# Patient Record
Sex: FEMALE | Race: WHITE | NOT HISPANIC OR LATINO | Employment: FULL TIME | URBAN - METROPOLITAN AREA
[De-identification: names, ages, dates, MRNs, and addresses within clinical notes are randomized per-mention and may not be internally consistent; named-entity substitution may affect disease eponyms.]

---

## 2017-01-14 ENCOUNTER — DOCUMENTATION ONLY (OUTPATIENT)
Dept: CARDIOLOGY | Facility: CLINIC | Age: 52
End: 2017-01-14

## 2017-01-14 NOTE — Clinical Note
January 14, 2017       TO: Krupa Mack  [address] [place] [facility] [address]       Dear Krupa Mack,    We are reviewing our outstanding orders.    Dr. Lee has ordered an office visit and lab work with her assistant for follow up.     Please contact the scheduling desk at [phone] to arranged for an appointment.     If you have any questions, please call the nurse phone @ [phone]. If you had your lab work done at another facility, please give us a call so we can locate the results.     Thank you

## 2017-01-26 DIAGNOSIS — I25.10 CAD (CORONARY ARTERY DISEASE): Primary | ICD-10-CM

## 2017-07-15 ENCOUNTER — HEALTH MAINTENANCE LETTER (OUTPATIENT)
Age: 52
End: 2017-07-15

## 2017-11-02 DIAGNOSIS — E78.2 MIXED HYPERLIPIDEMIA: ICD-10-CM

## 2017-11-02 DIAGNOSIS — I25.10 CAD (CORONARY ARTERY DISEASE): ICD-10-CM

## 2017-11-02 DIAGNOSIS — I25.10 CORONARY ARTERY DISEASE INVOLVING NATIVE HEART WITHOUT ANGINA PECTORIS, UNSPECIFIED VESSEL OR LESION TYPE: ICD-10-CM

## 2017-11-02 DIAGNOSIS — E78.5 HYPERLIPIDEMIA LDL GOAL <70: ICD-10-CM

## 2017-11-02 RX ORDER — ROSUVASTATIN CALCIUM 40 MG/1
40 TABLET, COATED ORAL DAILY
Qty: 90 TABLET | Refills: 0 | Status: SHIPPED | OUTPATIENT
Start: 2017-11-02 | End: 2018-02-12

## 2017-12-20 DIAGNOSIS — I25.10 CORONARY ARTERY DISEASE INVOLVING NATIVE HEART WITHOUT ANGINA PECTORIS, UNSPECIFIED VESSEL OR LESION TYPE: ICD-10-CM

## 2017-12-20 RX ORDER — METOPROLOL SUCCINATE 25 MG/1
25 TABLET, EXTENDED RELEASE ORAL DAILY
Qty: 90 TABLET | Refills: 0 | Status: SHIPPED | OUTPATIENT
Start: 2017-12-20 | End: 2018-02-14

## 2018-02-06 ENCOUNTER — TELEPHONE (OUTPATIENT)
Dept: CARDIOLOGY | Facility: CLINIC | Age: 53
End: 2018-02-06

## 2018-02-06 DIAGNOSIS — E78.2 MIXED HYPERLIPIDEMIA: ICD-10-CM

## 2018-02-06 DIAGNOSIS — E78.5 HYPERLIPIDEMIA LDL GOAL <70: ICD-10-CM

## 2018-02-06 DIAGNOSIS — I25.10 CORONARY ARTERY DISEASE INVOLVING NATIVE HEART WITHOUT ANGINA PECTORIS, UNSPECIFIED VESSEL OR LESION TYPE: ICD-10-CM

## 2018-02-06 DIAGNOSIS — I25.10 CAD (CORONARY ARTERY DISEASE): ICD-10-CM

## 2018-02-06 NOTE — TELEPHONE ENCOUNTER
Refill request sent from [facility] ([phone]) for Rosuvastatin 40 mg qd. LOV 9/2016. Letter sent 12/2017. No appt scheduled. Left vm for patient to call back to schedule appointment for additional refills. Refill not sent to pharmacy.

## 2018-02-12 RX ORDER — ROSUVASTATIN CALCIUM 40 MG/1
40 TABLET, COATED ORAL DAILY
Qty: 10 TABLET | Refills: 0 | Status: SHIPPED | OUTPATIENT
Start: 2018-02-12 | End: 2018-02-21

## 2018-02-14 RX ORDER — METOPROLOL SUCCINATE 25 MG/1
25 TABLET, EXTENDED RELEASE ORAL DAILY
Qty: 30 TABLET | Refills: 0 | Status: SHIPPED | OUTPATIENT
Start: 2018-02-14 | End: 2018-02-21

## 2018-02-21 ENCOUNTER — OFFICE VISIT (OUTPATIENT)
Dept: CARDIOLOGY | Facility: CLINIC | Age: 53
End: 2018-02-21
Payer: COMMERCIAL

## 2018-02-21 VITALS
DIASTOLIC BLOOD PRESSURE: 80 MMHG | HEIGHT: 65 IN | HEART RATE: 66 BPM | SYSTOLIC BLOOD PRESSURE: 130 MMHG | BODY MASS INDEX: 30.74 KG/M2 | WEIGHT: 184.5 LBS

## 2018-02-21 DIAGNOSIS — E78.5 HYPERLIPIDEMIA LDL GOAL <70: ICD-10-CM

## 2018-02-21 DIAGNOSIS — E78.2 MIXED HYPERLIPIDEMIA: ICD-10-CM

## 2018-02-21 DIAGNOSIS — I25.10 CORONARY ARTERY DISEASE INVOLVING NATIVE HEART WITHOUT ANGINA PECTORIS, UNSPECIFIED VESSEL OR LESION TYPE: ICD-10-CM

## 2018-02-21 DIAGNOSIS — E78.2 MIXED HYPERLIPIDEMIA: Primary | ICD-10-CM

## 2018-02-21 DIAGNOSIS — I25.10 CORONARY ARTERY DISEASE INVOLVING NATIVE CORONARY ARTERY OF NATIVE HEART WITHOUT ANGINA PECTORIS: ICD-10-CM

## 2018-02-21 LAB
CHOLEST SERPL-MCNC: 157 MG/DL
HDLC SERPL-MCNC: 44 MG/DL
LDLC SERPL CALC-MCNC: 100 MG/DL
NONHDLC SERPL-MCNC: 113 MG/DL
TRIGL SERPL-MCNC: 64 MG/DL

## 2018-02-21 PROCEDURE — 99214 OFFICE O/P EST MOD 30 MIN: CPT | Performed by: PHYSICIAN ASSISTANT

## 2018-02-21 PROCEDURE — 36415 COLL VENOUS BLD VENIPUNCTURE: CPT | Performed by: INTERNAL MEDICINE

## 2018-02-21 PROCEDURE — 80061 LIPID PANEL: CPT | Performed by: INTERNAL MEDICINE

## 2018-02-21 RX ORDER — NITROGLYCERIN 0.4 MG/1
0.4 TABLET SUBLINGUAL EVERY 5 MIN PRN
Qty: 25 TABLET | Refills: 1 | Status: SHIPPED | OUTPATIENT
Start: 2018-02-21 | End: 2022-11-17

## 2018-02-21 RX ORDER — METOPROLOL SUCCINATE 25 MG/1
25 TABLET, EXTENDED RELEASE ORAL DAILY
Qty: 30 TABLET | Refills: 11 | Status: SHIPPED | OUTPATIENT
Start: 2018-02-21 | End: 2019-03-25

## 2018-02-21 RX ORDER — ROSUVASTATIN CALCIUM 40 MG/1
40 TABLET, COATED ORAL DAILY
Qty: 30 TABLET | Refills: 11 | Status: SHIPPED | OUTPATIENT
Start: 2018-02-21 | End: 2018-04-11

## 2018-02-21 NOTE — LETTER
"2/21/2018    Karuna Bell MD, MD  [address]  [place] [address]    RE: Krupa Kinneyart       Dear Colleague,    I had the pleasure of seeing Krupa Mack in the [facility].      Cardiology Progress Note    Date of Service: 02/21/2018      Reason for visit: follow up hyperlipidemia, coronary disease.    Primary cardiologist: Dr. Pat Lee      HPI:  Ms. Mack is a very pleasant 52 year old female with a PMHx pertinent for known CAD, with prior NSTEMI in 9/2012 with chest pain and found to have a subtotally occluded large diagonal branch. She underwent a MECCA to the diagonal and has done well since that time. Her last echo, which was performed in 2012 showed EF 50-55% with a mild to moderate distal lateral/anterolaterl hypokinesis and probable apical hypokinesis. There was borderline LVH at that time as well. She was Dr. Lee last in 2016 and had continued to do well without any concerning cardiac symptoms. However, her routine lipids showed an LDL of 156 with an HDL of 44 despite 80mg of atorvastatin. She had admitted to paying less attention to her weight and eating habits. She was changed to rosuvastatin 40mg daily, and plans were her to follow up in 8-10 weeks. Unfortunately, she did not do so. She states that she lost her mother last year and \"I just was distracted and busy.\"    She is here again today, as she had no further medication refills. She tells me that she is feeling \"great\" and has no concerns. She denies chest pain, exertional dyspnea, palpitations, syncope/presyncope, PND, orthopnea, or leg edema. She has been working hard on trying to improve her health once again. Her dietary habits have improved over the last few months and she is slowly losing weight. She is down 4 lbs from her last visit here, and down 20 lbs since 2015. Her BP was elevated on arrival today, at 148 systolic; however she states she was rushing around to get " here on time. I took it again at the end of her visit, and it was 130/80. She routinely runs much lower than this, upon review of previous vitals. She does not ever check her BP outside of doctor visits as it's never been a problem in the past.     Her lipid pane today is improved; her LDL is down to 100 (from 156), and her HDL remains low at 44. TG are in the normal range at 64, which is also an improvement from 2016.       ASSESSMENT/PLAN:    1. Mixed hyperlipidemia.   --Improved lipid panel on rosuvastatin 40mg daily. Given her known coronary disease, would like to see her LDL slightly lower, but she plans to continue to make some dietary changes and is working on losing more weight.  She also relays a family history of dyslipidemia.     2. History of CAD.   --NSTEMI in 9/2012 with associated chest pain and found to have a subtotally occluded large diagonal branch. She underwent a MECCA to the diagonal and has done well since that time.    --Last echo 9/2012 showed EF 50-55% with a mild to moderate distal lateral/anterolaterl hypokinesis and probable apical hypokinesis. There was borderline LVH at that time as well.  As she remains asymptomatic will defer decision on repeat imaging/stress testing to Dr. Lee at follow up visit.   --Continue ASA 81mg daily.   --Continue metoprolol 25mg daily. BP up slightly today, with a resting HR in the low 60s. Asked her to try to check this routinely in other places if possible and call if it is persistently >140/90. This will also hopefully come back down with ongoing lifestyle changes. Historically she has not required additional antihypertensives.       Follow up plan: In 6 months with Dr. Pat Lee, as she has not seen her since 2016.       Orders this Visit:  Orders Placed This Encounter   Procedures     Lipid Profile     ALT     Follow-Up with Cardiologist     Orders Placed This Encounter   Medications     rosuvastatin (CRESTOR) 40 MG tablet     Sig:  Take 1 tablet (40 mg) by mouth daily     Dispense:  30 tablet     Refill:  11     nitroGLYcerin (NITROSTAT) 0.4 MG sublingual tablet     Sig: Place 1 tablet (0.4 mg) under the tongue every 5 minutes as needed for chest pain     Dispense:  25 tablet     Refill:  1     metoprolol succinate (TOPROL-XL) 25 MG 24 hr tablet     Sig: Take 1 tablet (25 mg) by mouth daily     Dispense:  30 tablet     Refill:  11     aspirin 81 MG EC tablet     Sig: Take 1 tablet (81 mg) by mouth daily     Dispense:  10 tablet     Refill:  0     Medications Discontinued During This Encounter   Medication Reason     rosuvastatin (CRESTOR) 40 MG tablet Reorder     nitroglycerin (NITROSTAT) 0.4 MG SL tablet Reorder     metoprolol succinate (TOPROL-XL) 25 MG 24 hr tablet Reorder     aspirin 81 MG EC tablet Reorder           CURRENT MEDICATIONS:  Current Outpatient Prescriptions   Medication Sig Dispense Refill     rosuvastatin (CRESTOR) 40 MG tablet Take 1 tablet (40 mg) by mouth daily 30 tablet 11     nitroGLYcerin (NITROSTAT) 0.4 MG sublingual tablet Place 1 tablet (0.4 mg) under the tongue every 5 minutes as needed for chest pain 25 tablet 1     metoprolol succinate (TOPROL-XL) 25 MG 24 hr tablet Take 1 tablet (25 mg) by mouth daily 30 tablet 11     aspirin 81 MG EC tablet Take 1 tablet (81 mg) by mouth daily 10 tablet 0     [DISCONTINUED] metoprolol succinate (TOPROL-XL) 25 MG 24 hr tablet Take 1 tablet (25 mg) by mouth daily 30 tablet 0     [DISCONTINUED] rosuvastatin (CRESTOR) 40 MG tablet Take 1 tablet (40 mg) by mouth daily 10 tablet 0     [DISCONTINUED] nitroglycerin (NITROSTAT) 0.4 MG SL tablet Place 1 tablet (0.4 mg) under the tongue every 5 minutes as needed for chest pain 90 tablet 1       ALLERGIES   No Known Allergies    PAST MEDICAL HISTORY:  Past Medical History:   Diagnosis Date     CAD (coronary artery disease) 9/2012    Non-ST elevation myocardial infarction.     Dyslipidemia      HTN (hypertension)      Hyperlipidaemia       "Obesity      Tobacco abuse     quit 9/25/2012       PAST SURGICAL HISTORY:  Past Surgical History:   Procedure Laterality Date     HEART CATH, ANGIOPLASTY  9/26/12    2.5 x16 mm Promus Element MECCA & 2.25 X 12 mm Promus Eement MECCA-lg diag branch     NO HISTORY OF SURGERY         FAMILY HISTORY:  Family History   Problem Relation Age of Onset     C.A.D. Father 75     DIABETES Father      teenager     DIABETES Mother 80     DIABETES Brother        SOCIAL HISTORY:  Social History     Social History     Marital status:      Spouse name: N/A     Number of children: N/A     Years of education: N/A     Social History Main Topics     Smoking status: Former Smoker     Smokeless tobacco: Never Used     Alcohol use Yes      Comment: rare     Drug use: No     Sexual activity: Not Asked     Other Topics Concern     Caffeine Concern Not Asked     0-1 coffee or soda     Sleep Concern No     Stress Concern No     Weight Concern No     Special Diet Yes     Heart healthy      Exercise Yes     walking     Seat Belt Yes     Social History Narrative    10/2012         Children-3     Work- [employer]     Tobacco- quit 9/2012- smoked 1/2ppdx 28yrs    ETOH- 1/ month    Exercise- now- walking               Review of Systems:  Cardiovascular: negative for chest pain, palpitations, orthopnea, PND, LE edema  Constitutional: negative for chills, sweats, fevers   Resp: Negative for dyspnea at rest, dyspnea on exertion, cough, known chronic lung disease  HEENT: Negative for new visual changes, frequent headaches  Gastrointestinal: negative for abdominal pain, diarrhea, nausea, vomiting  Hematologic/lymphatic: negative for current systemic anticoagulation, hx of blood clots  Musculoskeletal: negative for new back pain, joint pain  Neurological: negative for focal weakness, LOC, seizures, syncope      Physical Exam:  Vitals: /80  Pulse 66  Ht 1.651 m (5' 5\")  Wt 83.7 kg (184 lb 8 oz)  BMI 30.7 kg/m2   Wt " Readings from Last 4 Encounters:   02/21/18 83.7 kg (184 lb 8 oz)   09/22/16 85.3 kg (188 lb 1.6 oz)   06/16/15 95.3 kg (210 lb)   06/24/14 88.5 kg (195 lb)       GEN:  In general, this is a well nourished  female in no acute distress on room air.  Patient ambulatory.  HEENT:  Pupils equal, round. Sclerae nonicteric. Clear oropharynx.   NECK: Supple, no masses appreciated. Trachea midline. No JVD.   C/V:  Regular rate and rhythm, no murmur, rub or gallop.   RESP: Respirations are unlabored. No use of accessory muscles. Clear to auscultation bilaterally without wheezing, rales, or rhonchi.  GI: Abdomen soft, nontender, nondistended. No HSM appreciated.   EXTREM: No LE edema. No cyanosis or clubbing.  NEURO: Alert and oriented, cooperative. Gait normal. No obvious focal deficits.   PSYCH: Normal affect.  SKIN: Warm and dry. No rashes or petechiae appreciated.       Recent Lab Results:  LIPID RESULTS:  Lab Results   Component Value Date    CHOL 157 02/21/2018    HDL 44 (L) 02/21/2018     (H) 02/21/2018    TRIG 64 02/21/2018     Naya Rojo PA-C  [facility]  Pager [phone]    Thank you for allowing me to participate in the care of your patient.    Sincerely,     DERRICK Ramos     [facility]

## 2018-02-21 NOTE — MR AVS SNAPSHOT
After Visit Summary   2/21/2018    Krupa Tomas    MRN: [id number]           Patient Information     Date Of Birth          1965        Visit Information        Provider Department      2/21/2018 8:40 AM Naya Rojo PA [facility]        Today's Diagnoses     Mixed hyperlipidemia    -  1    Coronary artery disease involving native coronary artery of native heart without angina pectoris        Hyperlipidemia LDL goal <70          Care Instructions    Visit Summary:    Today we discussed:   Your cholesterol panel is improved, continue your healthy lifestyle changes, the LDL will hopefully continue to improve with weight loss.    If you get the aspirin over the counter, use 81mg (baby ASA), enteric coated which will be easier on your stomach.    Please check your BP when you can, if routinely >140/90s, please call the office.     Medication changes:    NONE  Sent refills to your pharmacy for 1 year    Test results:   Results for KRUPA TOMAS (MRN [id number]) as of 2/21/2018 08:57   Ref. Range 9/22/2016 07:49 2/21/2018 07:36   Cholesterol Latest Ref Range: <200 mg/dL 216 (H) 157   HDL Cholesterol Latest Ref Range: >49 mg/dL 44 (L) 44 (L)   LDL Cholesterol Calculated Latest Ref Range: <100 mg/dL 156 (H) 100 (H)   Non HDL Cholesterol Latest Ref Range: <130 mg/dL 172 (H) 113   Triglycerides Latest Ref Range: <150 mg/dL 82 64       Follow up:   6 months with Dr. BABIN    Please call my nurse Kiera at [phone] with any questions or concerns. Scheduling phone number: [phone] if needed.               Follow-ups after your visit        Additional Services     Follow-Up with Cardiologist                 Future tests that were ordered for you today     Open Future Orders        Priority Expected Expires Ordered    Follow-Up with Cardiologist Routine 8/21/2018 2/21/2019 2/21/2018            Who to contact     If you have questions or need follow up information  "about today's clinic visit or your schedule please contact [facility] directly at [phone].  Normal or non-critical lab and imaging results will be communicated to you by [employer], letter or phone within 4 business days after the clinic has received the results. If you do not hear from us within 7 days, please contact the clinic through [employer] or phone. If you have a critical or abnormal lab result, we will notify you by phone as soon as possible.  Submit refill requests through [employer] or call your pharmacy and they will forward the refill request to us. Please allow 3 business days for your refill to be completed.          Additional Information About Your Visit        [employer] Information     [employer] lets you send messages to your doctor, view your test results, renew your prescriptions, schedule appointments and more. To sign up, go to [email or web address] . Click on \"Log in\" on the left side of the screen, which will take you to the Welcome page. Then click on \"Sign up Now\" on the right side of the page.     You will be asked to enter the access code listed below, as well as some personal information. Please follow the directions to create your username and password.     Your access code is: [email or web address]  Expires: 2018  8:58 AM     Your access code will  in 90 days. If you need help or a new code, please call your [place] clinic or [phone].        Care EveryWhere ID     This is your Care EveryWhere ID. This could be used by other organizations to access your [place] medical records  [id number]        Your Vitals Were     Pulse Height BMI (Body Mass Index)             66 1.651 m (5' 5\") 30.7 kg/m2          Blood Pressure from Last 3 Encounters:   18 130/80   16 116/72   06/16/15 106/78    Weight from Last 3 Encounters:   18 83.7 kg (184 lb 8 oz)   16 85.3 kg (188 lb 1.6 oz)   06/16/15 95.3 kg (210 lb)              We " Performed the Following     Follow-Up with Cardiac Advanced Practice Provider          Where to get your medicines      These medications were sent to [facility] [id number] [facility] [place] - [address]  [address] [place] [address]     Phone:  [phone]     aspirin 81 MG EC tablet    metoprolol succinate 25 MG 24 hr tablet    nitroGLYcerin 0.4 MG sublingual tablet    rosuvastatin 40 MG tablet          Primary Care Provider Office Phone # Fax #    Karuna Bell [phone] [address] [place] [address]        Equal Access to Services     [facility]: Hadii aad ku hadasho Soomaali, waaxda luqadaha, qaybta kaalmada adeegyada, waxay idiin hayaan adeeg gordo mcintyre . So [facility] [phone].    ATENCIÓN: Si habla español, tiene a sierra disposición servicios gratuitos de asistencia lingüística. [facility] [phone].    We comply with applicable federal civil rights laws and [place] laws. We do not discriminate on the basis of race, color, national origin, age, disability, sex, sexual orientation, or gender identity.            Thank you!     Thank you for choosing [facility]  for your care. Our goal is always to provide you with excellent care. Hearing back from our patients is one way we can continue to improve our services. Please take a few minutes to complete the written survey that you may receive in the mail after your visit with us. Thank you!             Your Updated Medication List - Protect others around you: Learn how to safely use, store and throw away your medicines at www.disposemymeds.org.          This list is accurate as of 2/21/18  9:00 AM.  Always use your most recent med list.                   Brand Name Dispense Instructions for use Diagnosis    aspirin 81 MG EC tablet     10 tablet    Take 1 tablet (81 mg) by mouth daily    Coronary artery disease involving native coronary artery of native heart without angina  pectoris       metoprolol succinate 25 MG 24 hr tablet    TOPROL-XL    30 tablet    Take 1 tablet (25 mg) by mouth daily        nitroGLYcerin 0.4 MG sublingual tablet    NITROSTAT    25 tablet    Place 1 tablet (0.4 mg) under the tongue every 5 minutes as needed for chest pain    Hyperlipidemia LDL goal <70       rosuvastatin 40 MG tablet    CRESTOR    30 tablet    Take 1 tablet (40 mg) by mouth daily    Mixed hyperlipidemia, Hyperlipidemia LDL goal <70

## 2018-02-21 NOTE — PATIENT INSTRUCTIONS
Visit Summary:    Today we discussed:   Your cholesterol panel is improved, continue your healthy lifestyle changes, the LDL will hopefully continue to improve with weight loss.    If you get the aspirin over the counter, use 81mg (baby ASA), enteric coated which will be easier on your stomach.    Please check your BP when you can, if routinely >140/90s, please call the office.     Medication changes:    NONE  Sent refills to your pharmacy for 1 year    Test results:   Results for LIEN TOMAS (MRN [id number]) as of 2/21/2018 08:57   Ref. Range 9/22/2016 07:49 2/21/2018 07:36   Cholesterol Latest Ref Range: <200 mg/dL 216 (H) 157   HDL Cholesterol Latest Ref Range: >49 mg/dL 44 (L) 44 (L)   LDL Cholesterol Calculated Latest Ref Range: <100 mg/dL 156 (H) 100 (H)   Non HDL Cholesterol Latest Ref Range: <130 mg/dL 172 (H) 113   Triglycerides Latest Ref Range: <150 mg/dL 82 64       Follow up:   6 months with Dr. BABIN    Please call my nurse Kiera at [phone] with any questions or concerns. Scheduling phone number: [phone] if needed.

## 2018-02-21 NOTE — PROGRESS NOTES
"  Cardiology Progress Note    Date of Service: 02/21/2018      Reason for visit: follow up hyperlipidemia, coronary disease.    Primary cardiologist: Dr. Pat Lee      HPI:  Ms. Mack is a very pleasant 52 year old female with a PMHx pertinent for known CAD, with prior NSTEMI in 9/2012 with chest pain and found to have a subtotally occluded large diagonal branch. She underwent a MECCA to the diagonal and has done well since that time. Her last echo, which was performed in 2012 showed EF 50-55% with a mild to moderate distal lateral/anterolaterl hypokinesis and probable apical hypokinesis. There was borderline LVH at that time as well. She was Dr. Lee last in 2016 and had continued to do well without any concerning cardiac symptoms. However, her routine lipids showed an LDL of 156 with an HDL of 44 despite 80mg of atorvastatin. She had admitted to paying less attention to her weight and eating habits. She was changed to rosuvastatin 40mg daily, and plans were her to follow up in 8-10 weeks. Unfortunately, she did not do so. She states that she lost her mother last year and \"I just was distracted and busy.\"    She is here again today, as she had no further medication refills. She tells me that she is feeling \"great\" and has no concerns. She denies chest pain, exertional dyspnea, palpitations, syncope/presyncope, PND, orthopnea, or leg edema. She has been working hard on trying to improve her health once again. Her dietary habits have improved over the last few months and she is slowly losing weight. She is down 4 lbs from her last visit here, and down 20 lbs since 2015. Her BP was elevated on arrival today, at 148 systolic; however she states she was rushing around to get here on time. I took it again at the end of her visit, and it was 130/80. She routinely runs much lower than this, upon review of previous vitals. She does not ever check her BP outside of doctor visits as it's never been a " problem in the past.     Her lipid pane today is improved; her LDL is down to 100 (from 156), and her HDL remains low at 44. TG are in the normal range at 64, which is also an improvement from 2016.       ASSESSMENT/PLAN:    1. Mixed hyperlipidemia.   --Improved lipid panel on rosuvastatin 40mg daily. Given her known coronary disease, would like to see her LDL slightly lower, but she plans to continue to make some dietary changes and is working on losing more weight.  She also relays a family history of dyslipidemia.     2. History of CAD.   --NSTEMI in 9/2012 with associated chest pain and found to have a subtotally occluded large diagonal branch. She underwent a MECCA to the diagonal and has done well since that time.    --Last echo 9/2012 showed EF 50-55% with a mild to moderate distal lateral/anterolaterl hypokinesis and probable apical hypokinesis. There was borderline LVH at that time as well.  As she remains asymptomatic will defer decision on repeat imaging/stress testing to Dr. Lee at follow up visit.   --Continue ASA 81mg daily.   --Continue metoprolol 25mg daily. BP up slightly today, with a resting HR in the low 60s. Asked her to try to check this routinely in other places if possible and call if it is persistently >140/90. This will also hopefully come back down with ongoing lifestyle changes. Historically she has not required additional antihypertensives.       Follow up plan: In 6 months with Dr. Pat Lee, as she has not seen her since 2016.       Orders this Visit:  Orders Placed This Encounter   Procedures     Lipid Profile     ALT     Follow-Up with Cardiologist     Orders Placed This Encounter   Medications     rosuvastatin (CRESTOR) 40 MG tablet     Sig: Take 1 tablet (40 mg) by mouth daily     Dispense:  30 tablet     Refill:  11     nitroGLYcerin (NITROSTAT) 0.4 MG sublingual tablet     Sig: Place 1 tablet (0.4 mg) under the tongue every 5 minutes as needed for chest pain      Dispense:  25 tablet     Refill:  1     metoprolol succinate (TOPROL-XL) 25 MG 24 hr tablet     Sig: Take 1 tablet (25 mg) by mouth daily     Dispense:  30 tablet     Refill:  11     aspirin 81 MG EC tablet     Sig: Take 1 tablet (81 mg) by mouth daily     Dispense:  10 tablet     Refill:  0     Medications Discontinued During This Encounter   Medication Reason     rosuvastatin (CRESTOR) 40 MG tablet Reorder     nitroglycerin (NITROSTAT) 0.4 MG SL tablet Reorder     metoprolol succinate (TOPROL-XL) 25 MG 24 hr tablet Reorder     aspirin 81 MG EC tablet Reorder           CURRENT MEDICATIONS:  Current Outpatient Prescriptions   Medication Sig Dispense Refill     rosuvastatin (CRESTOR) 40 MG tablet Take 1 tablet (40 mg) by mouth daily 30 tablet 11     nitroGLYcerin (NITROSTAT) 0.4 MG sublingual tablet Place 1 tablet (0.4 mg) under the tongue every 5 minutes as needed for chest pain 25 tablet 1     metoprolol succinate (TOPROL-XL) 25 MG 24 hr tablet Take 1 tablet (25 mg) by mouth daily 30 tablet 11     aspirin 81 MG EC tablet Take 1 tablet (81 mg) by mouth daily 10 tablet 0     [DISCONTINUED] metoprolol succinate (TOPROL-XL) 25 MG 24 hr tablet Take 1 tablet (25 mg) by mouth daily 30 tablet 0     [DISCONTINUED] rosuvastatin (CRESTOR) 40 MG tablet Take 1 tablet (40 mg) by mouth daily 10 tablet 0     [DISCONTINUED] nitroglycerin (NITROSTAT) 0.4 MG SL tablet Place 1 tablet (0.4 mg) under the tongue every 5 minutes as needed for chest pain 90 tablet 1       ALLERGIES   No Known Allergies    PAST MEDICAL HISTORY:  Past Medical History:   Diagnosis Date     CAD (coronary artery disease) 9/2012    Non-ST elevation myocardial infarction.     Dyslipidemia      HTN (hypertension)      Hyperlipidaemia      Obesity      Tobacco abuse     quit 9/25/2012       PAST SURGICAL HISTORY:  Past Surgical History:   Procedure Laterality Date     HEART CATH, ANGIOPLASTY  9/26/12    2.5 x16 mm Promus Element MECCA & 2.25 X 12 mm Promus Eement  "MECCA- dia branch     NO HISTORY OF SURGERY         FAMILY HISTORY:  Family History   Problem Relation Age of Onset     C.A.D. Father 75     DIABETES Father      teenager     DIABETES Mother 80     DIABETES Brother        SOCIAL HISTORY:  Social History     Social History     Marital status:      Spouse name: N/A     Number of children: N/A     Years of education: N/A     Social History Main Topics     Smoking status: Former Smoker     Smokeless tobacco: Never Used     Alcohol use Yes      Comment: rare     Drug use: No     Sexual activity: Not Asked     Other Topics Concern     Caffeine Concern Not Asked     0-1 coffee or soda     Sleep Concern No     Stress Concern No     Weight Concern No     Special Diet Yes     Heart healthy      Exercise Yes     walking     Seat Belt Yes     Social History Narrative    10/2012         Children-3     Work- [employer]-     Tobacco- quit 9/2012- smoked 1/2ppdx 28yrs    ETOH- 1/ month    Exercise- now- walking               Review of Systems:  Cardiovascular: negative for chest pain, palpitations, orthopnea, PND, LE edema  Constitutional: negative for chills, sweats, fevers   Resp: Negative for dyspnea at rest, dyspnea on exertion, cough, known chronic lung disease  HEENT: Negative for new visual changes, frequent headaches  Gastrointestinal: negative for abdominal pain, diarrhea, nausea, vomiting  Hematologic/lymphatic: negative for current systemic anticoagulation, hx of blood clots  Musculoskeletal: negative for new back pain, joint pain  Neurological: negative for focal weakness, LOC, seizures, syncope      Physical Exam:  Vitals: /80  Pulse 66  Ht 1.651 m (5' 5\")  Wt 83.7 kg (184 lb 8 oz)  BMI 30.7 kg/m2   Wt Readings from Last 4 Encounters:   02/21/18 83.7 kg (184 lb 8 oz)   09/22/16 85.3 kg (188 lb 1.6 oz)   06/16/15 95.3 kg (210 lb)   06/24/14 88.5 kg (195 lb)       GEN:  In general, this is a well nourished  female in no " acute distress on room air.  Patient ambulatory.  HEENT:  Pupils equal, round. Sclerae nonicteric. Clear oropharynx.   NECK: Supple, no masses appreciated. Trachea midline. No JVD.   C/V:  Regular rate and rhythm, no murmur, rub or gallop.   RESP: Respirations are unlabored. No use of accessory muscles. Clear to auscultation bilaterally without wheezing, rales, or rhonchi.  GI: Abdomen soft, nontender, nondistended. No HSM appreciated.   EXTREM: No LE edema. No cyanosis or clubbing.  NEURO: Alert and oriented, cooperative. Gait normal. No obvious focal deficits.   PSYCH: Normal affect.  SKIN: Warm and dry. No rashes or petechiae appreciated.       Recent Lab Results:  LIPID RESULTS:  Lab Results   Component Value Date    CHOL 157 02/21/2018    HDL 44 (L) 02/21/2018     (H) 02/21/2018    TRIG 64 02/21/2018         Naya Rojo PA-C  [facility]  Pager [phone]

## 2018-04-11 DIAGNOSIS — E78.5 HYPERLIPIDEMIA LDL GOAL <70: ICD-10-CM

## 2018-04-11 DIAGNOSIS — E78.2 MIXED HYPERLIPIDEMIA: ICD-10-CM

## 2018-04-11 RX ORDER — ROSUVASTATIN CALCIUM 40 MG/1
40 TABLET, COATED ORAL DAILY
Qty: 90 TABLET | Refills: 3 | Status: SHIPPED | OUTPATIENT
Start: 2018-04-11 | End: 2018-04-18

## 2018-04-18 DIAGNOSIS — E78.5 HYPERLIPIDEMIA LDL GOAL <70: ICD-10-CM

## 2018-04-18 DIAGNOSIS — E78.2 MIXED HYPERLIPIDEMIA: ICD-10-CM

## 2018-04-18 RX ORDER — ROSUVASTATIN CALCIUM 40 MG/1
40 TABLET, COATED ORAL DAILY
Qty: 90 TABLET | Refills: 1 | Status: SHIPPED | OUTPATIENT
Start: 2018-04-18 | End: 2019-04-22

## 2019-03-25 DIAGNOSIS — I25.10 CAD (CORONARY ARTERY DISEASE): Primary | ICD-10-CM

## 2019-03-25 RX ORDER — METOPROLOL SUCCINATE 25 MG/1
25 TABLET, EXTENDED RELEASE ORAL DAILY
Qty: 90 TABLET | Refills: 0 | Status: SHIPPED | OUTPATIENT
Start: 2019-03-25 | End: 2019-06-19

## 2019-04-22 DIAGNOSIS — E78.2 MIXED HYPERLIPIDEMIA: ICD-10-CM

## 2019-04-22 DIAGNOSIS — E78.5 HYPERLIPIDEMIA LDL GOAL <70: ICD-10-CM

## 2019-04-22 RX ORDER — ROSUVASTATIN CALCIUM 40 MG/1
40 TABLET, COATED ORAL DAILY
Qty: 90 TABLET | Refills: 0 | Status: SHIPPED | OUTPATIENT
Start: 2019-04-22 | End: 2019-07-15

## 2019-04-22 RX ORDER — ROSUVASTATIN CALCIUM 40 MG/1
40 TABLET, COATED ORAL DAILY
Qty: 60 TABLET | Refills: 0 | Status: SHIPPED | OUTPATIENT
Start: 2019-04-22 | End: 2019-04-22

## 2019-04-22 NOTE — TELEPHONE ENCOUNTER
Received refill request for: Rosuvastatin (crestor)  Last OV was: 2/21/2018 with Naya MEZA  Labs/EKG: last lipids 2/2018  F/U scheduled: with Dr Allan on 5/3/2019 w/labs - lipids  New script sent to: [facility]  Kiera Santiago RN 04/22/19 4:24 PM

## 2019-05-03 ENCOUNTER — OFFICE VISIT (OUTPATIENT)
Dept: CARDIOLOGY | Facility: CLINIC | Age: 54
End: 2019-05-03
Payer: COMMERCIAL

## 2019-05-03 VITALS
HEART RATE: 68 BPM | OXYGEN SATURATION: 97 % | WEIGHT: 171 LBS | DIASTOLIC BLOOD PRESSURE: 80 MMHG | HEIGHT: 65 IN | SYSTOLIC BLOOD PRESSURE: 132 MMHG | BODY MASS INDEX: 28.49 KG/M2

## 2019-05-03 DIAGNOSIS — E78.5 HYPERLIPIDEMIA LDL GOAL <70: ICD-10-CM

## 2019-05-03 DIAGNOSIS — I25.10 CORONARY ARTERY DISEASE INVOLVING NATIVE CORONARY ARTERY OF NATIVE HEART WITHOUT ANGINA PECTORIS: Primary | ICD-10-CM

## 2019-05-03 DIAGNOSIS — E78.2 MIXED HYPERLIPIDEMIA: ICD-10-CM

## 2019-05-03 DIAGNOSIS — I25.10 CORONARY ARTERY DISEASE INVOLVING NATIVE CORONARY ARTERY OF NATIVE HEART WITHOUT ANGINA PECTORIS: ICD-10-CM

## 2019-05-03 LAB
ALT SERPL W P-5'-P-CCNC: 9 U/L (ref 5–30)
CHOLEST SERPL-MCNC: 170 MG/DL
HDLC SERPL-MCNC: 50 MG/DL
LDLC SERPL CALC-MCNC: 104 MG/DL
NONHDLC SERPL-MCNC: 120 MG/DL
TRIGL SERPL-MCNC: 80 MG/DL

## 2019-05-03 PROCEDURE — 99214 OFFICE O/P EST MOD 30 MIN: CPT | Performed by: INTERNAL MEDICINE

## 2019-05-03 PROCEDURE — 84460 ALANINE AMINO (ALT) (SGPT): CPT | Performed by: INTERNAL MEDICINE

## 2019-05-03 PROCEDURE — 36415 COLL VENOUS BLD VENIPUNCTURE: CPT | Performed by: INTERNAL MEDICINE

## 2019-05-03 PROCEDURE — 80061 LIPID PANEL: CPT | Performed by: INTERNAL MEDICINE

## 2019-05-03 ASSESSMENT — MIFFLIN-ST. JEOR: SCORE: 1371.53

## 2019-05-03 NOTE — LETTER
5/3/2019    Karuna Bell MD, MD  [address]  [place] [address]    RE: Krupa Mack       Dear Colleague,    I had the pleasure of seeing Krupa FARRAH Mack in the [facility].    HPI and Plan:   See dictation    No orders of the defined types were placed in this encounter.      No orders of the defined types were placed in this encounter.      There are no discontinued medications.      Encounter Diagnoses   Name Primary?     Coronary artery disease involving native coronary artery of native heart without angina pectoris Yes     Hyperlipidemia LDL goal <70        CURRENT MEDICATIONS:  Current Outpatient Medications   Medication Sig Dispense Refill     aspirin 81 MG EC tablet Take 1 tablet (81 mg) by mouth daily 10 tablet 0     metoprolol succinate ER (TOPROL-XL) 25 MG 24 hr tablet Take 1 tablet (25 mg) by mouth daily 90 tablet 0     nitroGLYcerin (NITROSTAT) 0.4 MG sublingual tablet Place 1 tablet (0.4 mg) under the tongue every 5 minutes as needed for chest pain 25 tablet 1     rosuvastatin (CRESTOR) 40 MG tablet Take 1 tablet (40 mg) by mouth daily 90 tablet 0       ALLERGIES   No Known Allergies    PAST MEDICAL HISTORY:  Past Medical History:   Diagnosis Date     CAD (coronary artery disease) 9/2012    Non-ST elevation myocardial infarction.     Dyslipidemia      HTN (hypertension)      Hyperlipidaemia      Obesity      Tobacco abuse     quit 9/25/2012       PAST SURGICAL HISTORY:  Past Surgical History:   Procedure Laterality Date     HEART CATH, ANGIOPLASTY  9/26/12    2.5 x16 mm Promus Element MECCA & 2.25 X 12 mm Promus Eement MECCA-lg diag branch     NO HISTORY OF SURGERY         FAMILY HISTORY:  Family History   Problem Relation Age of Onset     C.A.D. Father 75     Diabetes Father         teenager     Diabetes Mother 80     Diabetes Brother        SOCIAL HISTORY:  Social History     Socioeconomic History     Marital status:      Spouse name: None     Number  of children: None     Years of education: None     Highest education level: None   Occupational History     None   Social Needs     Financial resource strain: None     Food insecurity:     Worry: None     Inability: None     Transportation needs:     Medical: None     Non-medical: None   Tobacco Use     Smoking status: Former Smoker     Smokeless tobacco: Never Used   Substance and Sexual Activity     Alcohol use: Yes     Comment: 2 glasses of wine per week     Drug use: No     Sexual activity: None   Lifestyle     Physical activity:     Days per week: None     Minutes per session: None     Stress: None   Relationships     Social connections:     Talks on phone: None     Gets together: None     Attends Zoroastrianism service: None     Active member of club or organization: None     Attends meetings of clubs or organizations: None     Relationship status: None     Intimate partner violence:     Fear of current or ex partner: None     Emotionally abused: None     Physically abused: None     Forced sexual activity: None   Other Topics Concern     Parent/sibling w/ CABG, MI or angioplasty before 65F 55M? Not Asked      Service Not Asked     Blood Transfusions Not Asked     Caffeine Concern Not Asked     Comment: 0-1 coffee or soda     Occupational Exposure Not Asked     Hobby Hazards Not Asked     Sleep Concern No     Stress Concern No     Weight Concern No     Special Diet Yes     Comment: Heart healthy      Back Care Not Asked     Exercise Yes     Comment: walking     Bike Helmet Not Asked     Seat Belt Yes     Self-Exams Not Asked   Social History Narrative    10/2012         Children-3     Work- [employer]-     Tobacco- quit 9/2012- smoked 1/2ppdx 28yrs    ETOH- 1/ month    Exercise- now- walking               Review of Systems:  Skin:  Negative       Eyes:  Negative      ENT:  Negative      Respiratory:  Negative       Cardiovascular:  Negative;palpitations;chest pain;syncope or  "near-syncope;cyanosis;lightheadedness;dizziness;exercise intolerance;fatigue;edema      Gastroenterology: Negative      Genitourinary:  Negative      Musculoskeletal:  Negative      Neurologic:  Negative      Psychiatric:  Negative (stress)    Heme/Lymph/Imm:  Negative      Endocrine:  Negative        Physical Exam:  Vitals: /80   Pulse 68   Ht 1.651 m (5' 5\")   Wt 77.6 kg (171 lb)   SpO2 97%   BMI 28.46 kg/m       Constitutional:  cooperative;in no acute distress        Skin:  warm and dry to the touch          Head:  normocephalic        Eyes:  conjunctivae and lids unremarkable        Lymph:No Cervical lymphadenopathy present     ENT:  no pallor or cyanosis        Neck:  carotid pulses are full and equal bilaterally;JVP normal        Respiratory:  clear to auscultation         Cardiac: regular rhythm;normal S1 and S2;no murmurs, gallops or rubs detected                pulses full and equal                                        GI:  abdomen soft;non-tender        Extremities and Muscular Skeletal:  no deformities, clubbing, cyanosis, erythema observed              Neurological:  no gross motor deficits        Psych:  Alert and Oriented x 3        CC  Karuna Bell MD  [address] [address]                Thank you for allowing me to participate in the care of your patient.      Sincerely,     Arya Allan MD, MD     [facility]    cc:   Karuna Bell MD  [address] [address]        "

## 2019-05-03 NOTE — LETTER
5/3/2019      Karuna Bell MD, MD  [address]  [facility] [address]      RE: Krupa Mack       Dear Colleague,    I had the [facility] Krupa [facility].    Service Date: 05/03/2019      CONSULTATION NOTE      REASON FOR CONSULTATION:  Coronary artery disease.       HISTORY OF PRESENT ILLNESS:  I had the pleasure of seeing Krupa Mack at the [facility] in [place] this morning.  She is a very pleasant 54-year-old female with known coronary artery disease status post prior lateral *** myocardial infarction in 2002.  Coronary angiogram at that time demonstrated an occluded large diagonal branch.  This was successfully stented with 2 stents.  Echocardiogram post MI demonstrated EF of 55%.      Since her MI and intervention approximately 6-1/2 years ago, she has not had any cardiopulmonary symptoms.  She is active and denies any chest pain or shortness of breath.  No palpitations, presyncope or syncope.  Her risk factors are reasonably well controlled with the exception of hyperlipidemia.      IMPRESSION, REPORT AND PLAN:   1.  Coronary artery disease status post prior MI with stenting of the diagonal.   2.  Hypertension.   3.  Hyperlipidemia.      She is doing quite well from a cardiac point of view and denies any symptoms at this point.  She is active and exercises regularly.  I reviewed her most recent lipid profile from today which showed an LDL of 104, total cholesterol of 170 and HDL of 50.      Although she is on high-dose of rosuvastatin, we have room to improve in her cholesterol.  I recommended that she continue with her exercise program and to lose more weight.  I think with additional 10-15 pound weight loss, we should be able to get her LDL close to 70.      It was a pleasure seeing Ms. Mack in clinic this morning.  I will see her in approximately 1-2 years for followup.  I appreciate the opportunity to  take part in her care.         MARY ALLAN MD             D: 2019   T: 2019   MT: SABRINA      Name:     LIEN TOMAS   MRN:      [id number]        Account:      [id number]   :      1965           Service Date: 2019      Document: [id number]         Outpatient Encounter Medications as of 5/3/2019   Medication Sig Dispense Refill     aspirin 81 MG EC tablet Take 1 tablet (81 mg) by mouth daily 10 tablet 0     metoprolol succinate ER (TOPROL-XL) 25 MG 24 hr tablet Take 1 tablet (25 mg) by mouth daily 90 tablet 0     nitroGLYcerin (NITROSTAT) 0.4 MG sublingual tablet Place 1 tablet (0.4 mg) under the tongue every 5 minutes as needed for chest pain 25 tablet 1     rosuvastatin (CRESTOR) 40 MG tablet Take 1 tablet (40 mg) by mouth daily 90 tablet 0     No facility-administered encounter medications on file as of 5/3/2019.        Again, thank you for allowing me to participate in the care of your patient.      Sincerely,    Mary Allan MD, MD     [facility]

## 2019-05-03 NOTE — PROGRESS NOTES
HPI and Plan:   See dictation    No orders of the defined types were placed in this encounter.      No orders of the defined types were placed in this encounter.      There are no discontinued medications.      Encounter Diagnoses   Name Primary?     Coronary artery disease involving native coronary artery of native heart without angina pectoris Yes     Hyperlipidemia LDL goal <70        CURRENT MEDICATIONS:  Current Outpatient Medications   Medication Sig Dispense Refill     aspirin 81 MG EC tablet Take 1 tablet (81 mg) by mouth daily 10 tablet 0     metoprolol succinate ER (TOPROL-XL) 25 MG 24 hr tablet Take 1 tablet (25 mg) by mouth daily 90 tablet 0     nitroGLYcerin (NITROSTAT) 0.4 MG sublingual tablet Place 1 tablet (0.4 mg) under the tongue every 5 minutes as needed for chest pain 25 tablet 1     rosuvastatin (CRESTOR) 40 MG tablet Take 1 tablet (40 mg) by mouth daily 90 tablet 0       ALLERGIES   No Known Allergies    PAST MEDICAL HISTORY:  Past Medical History:   Diagnosis Date     CAD (coronary artery disease) 9/2012    Non-ST elevation myocardial infarction.     Dyslipidemia      HTN (hypertension)      Hyperlipidaemia      Obesity      Tobacco abuse     quit 9/25/2012       PAST SURGICAL HISTORY:  Past Surgical History:   Procedure Laterality Date     HEART CATH, ANGIOPLASTY  9/26/12    2.5 x16 mm Promus Element MECCA & 2.25 X 12 mm Promus Eement MECCA-lg diag branch     NO HISTORY OF SURGERY         FAMILY HISTORY:  Family History   Problem Relation Age of Onset     C.A.D. Father 75     Diabetes Father         teenager     Diabetes Mother 80     Diabetes Brother        SOCIAL HISTORY:  Social History     Socioeconomic History     Marital status:      Spouse name: None     Number of children: None     Years of education: None     Highest education level: None   Occupational History     None   Social Needs     Financial resource strain: None     Food insecurity:     Worry: None     Inability: None      Transportation needs:     Medical: None     Non-medical: None   Tobacco Use     Smoking status: Former Smoker     Smokeless tobacco: Never Used   Substance and Sexual Activity     Alcohol use: Yes     Comment: 2 glasses of wine per week     Drug use: No     Sexual activity: None   Lifestyle     Physical activity:     Days per week: None     Minutes per session: None     Stress: None   Relationships     Social connections:     Talks on phone: None     Gets together: None     Attends Yazidism service: None     Active member of club or organization: None     Attends meetings of clubs or organizations: None     Relationship status: None     Intimate partner violence:     Fear of current or ex partner: None     Emotionally abused: None     Physically abused: None     Forced sexual activity: None   Other Topics Concern     Parent/sibling w/ CABG, MI or angioplasty before 65F 55M? Not Asked      Service Not Asked     Blood Transfusions Not Asked     Caffeine Concern Not Asked     Comment: 0-1 coffee or soda     Occupational Exposure Not Asked     Hobby Hazards Not Asked     Sleep Concern No     Stress Concern No     Weight Concern No     Special Diet Yes     Comment: Heart healthy      Back Care Not Asked     Exercise Yes     Comment: walking     Bike Helmet Not Asked     Seat Belt Yes     Self-Exams Not Asked   Social History Narrative    10/2012         Children-3     Work- [employer]-     Tobacco- quit 9/2012- smoked 1/2ppdx 28yrs    ETOH- 1/ month    Exercise- now- walking               Review of Systems:  Skin:  Negative       Eyes:  Negative      ENT:  Negative      Respiratory:  Negative       Cardiovascular:  Negative;palpitations;chest pain;syncope or near-syncope;cyanosis;lightheadedness;dizziness;exercise intolerance;fatigue;edema      Gastroenterology: Negative      Genitourinary:  Negative      Musculoskeletal:  Negative      Neurologic:  Negative      Psychiatric:  Negative  "(stress)    Heme/Lymph/Imm:  Negative      Endocrine:  Negative        Physical Exam:  Vitals: /80   Pulse 68   Ht 1.651 m (5' 5\")   Wt 77.6 kg (171 lb)   SpO2 97%   BMI 28.46 kg/m      Constitutional:  cooperative;in no acute distress        Skin:  warm and dry to the touch          Head:  normocephalic        Eyes:  conjunctivae and lids unremarkable        Lymph:No Cervical lymphadenopathy present     ENT:  no pallor or cyanosis        Neck:  carotid pulses are full and equal bilaterally;JVP normal        Respiratory:  clear to auscultation         Cardiac: regular rhythm;normal S1 and S2;no murmurs, gallops or rubs detected                pulses full and equal                                        GI:  abdomen soft;non-tender        Extremities and Muscular Skeletal:  no deformities, clubbing, cyanosis, erythema observed              Neurological:  no gross motor deficits        Psych:  Alert and Oriented x 3        CC  Karuna Bell MD  [address] [place] [address]              "

## 2019-05-03 NOTE — PROGRESS NOTES
Service Date: 2019      CONSULTATION NOTE      REASON FOR CONSULTATION:  Coronary artery disease.       HISTORY OF PRESENT ILLNESS:  I had the pleasure of seeing Krupa Tomas at the [facility] in [place] this morning.  She is a very pleasant 54-year-old female with known coronary artery disease status post prior lateral  myocardial infarction in .  Coronary angiogram at that time demonstrated an occluded large diagonal branch.  This was successfully stented with 2 stents.  Echocardiogram post MI demonstrated EF of 55%.      Since her MI and intervention approximately 6-1/2 years ago, she has not had any cardiopulmonary symptoms.  She is active and denies any chest pain or shortness of breath.  No palpitations, presyncope or syncope.  Her risk factors are reasonably well controlled with the exception of hyperlipidemia.      IMPRESSION, REPORT AND PLAN:   1.  Coronary artery disease status post prior MI with stenting of the diagonal.   2.  Hypertension.   3.  Hyperlipidemia.      She is doing quite well from a cardiac point of view and denies any symptoms at this point.  She is active and exercises regularly.  I reviewed her most recent lipid profile from today which showed an LDL of 104, total cholesterol of 170 and HDL of 50.      Although she is on high-dose of rosuvastatin, we have room to improve in her cholesterol.  I recommended that she continue with her exercise program and to lose more weight.  I think with additional 10-15 pound weight loss, we should be able to get her LDL close to 70.      It was a pleasure seeing Ms. Tomas in clinic this morning.  I will see her in approximately 1-2 years for followup.  I appreciate the opportunity to take part in her care.         MARY GREGG MD             D: 2019   T: 2019   MT: SABRINA      Name:     KRUPA TOMAS   MRN:      [id number]        Account:      [id number]   :      1965           Service  Date: 05/03/2019      Document: [id number]

## 2019-05-03 NOTE — LETTER
5/3/2019      Karuna Bell MD, MD  [address]  [facility] [address]      RE: Krupa Mack       Dear Colleague,    I had the pleasure of seeing Krupa Mack in the [facility].    Service Date: 05/03/2019      CONSULTATION NOTE      REASON FOR CONSULTATION:  Coronary artery disease.       HISTORY OF PRESENT ILLNESS:  I had the pleasure of seeing Krupa Mack at the [facility] in [place] this morning.  She is a very pleasant 54-year-old female with known coronary artery disease status post prior lateral *** myocardial infarction in 2002.  Coronary angiogram at that time demonstrated an occluded large diagonal branch.  This was successfully stented with 2 stents.  Echocardiogram post MI demonstrated EF of 55%.      Since her MI and intervention approximately 6-1/2 years ago, she has not had any cardiopulmonary symptoms.  She is active and denies any chest pain or shortness of breath.  No palpitations, presyncope or syncope.  Her risk factors are reasonably well controlled with the exception of hyperlipidemia.      IMPRESSION, REPORT AND PLAN:   1.  Coronary artery disease status post prior MI with stenting of the diagonal.   2.  Hypertension.   3.  Hyperlipidemia.      She is doing quite well from a cardiac point of view and denies any symptoms at this point.  She is active and exercises regularly.  I reviewed her most recent lipid profile from today which showed an LDL of 104, total cholesterol of 170 and HDL of 50.      Although she is on high-dose of rosuvastatin, we have room to improve in her cholesterol.  I recommended that she continue with her exercise program and to lose more weight.  I think with additional 10-15 pound weight loss, we should be able to get her LDL close to 70.      It was a pleasure seeing Ms. Mack in clinic this morning.  I will see her in approximately 1-2 years for followup.  I appreciate the opportunity to  take part in her care.         MARY ALLAN MD             D: 2019   T: 2019   MT: SABRINA      Name:     LIEN TOMAS   MRN:      [id number]        Account:      [id number]   :      1965           Service Date: 2019      Document: [id number]         Outpatient Encounter Medications as of 5/3/2019   Medication Sig Dispense Refill     aspirin 81 MG EC tablet Take 1 tablet (81 mg) by mouth daily 10 tablet 0     metoprolol succinate ER (TOPROL-XL) 25 MG 24 hr tablet Take 1 tablet (25 mg) by mouth daily 90 tablet 0     nitroGLYcerin (NITROSTAT) 0.4 MG sublingual tablet Place 1 tablet (0.4 mg) under the tongue every 5 minutes as needed for chest pain 25 tablet 1     rosuvastatin (CRESTOR) 40 MG tablet Take 1 tablet (40 mg) by mouth daily 90 tablet 0     No facility-administered encounter medications on file as of 5/3/2019.        Again, thank you for allowing me to participate in the care of your patient.      Sincerely,    Mary Allan MD, MD     [facility]

## 2019-05-03 NOTE — LETTER
5/3/2019      Karuna Bell MD, MD  [address]  [facility] [address]      RE: Krupa Mack       Dear Colleague,    I had the pleasure of seeing Krupa Mack in the [facility].    Service Date: 05/03/2019      CONSULTATION NOTE      REASON FOR CONSULTATION:  Coronary artery disease.       HISTORY OF PRESENT ILLNESS:  I had the pleasure of seeing Krupa Mack at the [facility] in [place] this morning.  She is a very pleasant 54-year-old female with known coronary artery disease status post prior lateral  myocardial infarction in 2002.  Coronary angiogram at that time demonstrated an occluded large diagonal branch.  This was successfully stented with 2 stents.  Echocardiogram post MI demonstrated EF of 55%.      Since her MI and intervention approximately 6-1/2 years ago, she has not had any cardiopulmonary symptoms.  She is active and denies any chest pain or shortness of breath.  No palpitations, presyncope or syncope.  Her risk factors are reasonably well controlled with the exception of hyperlipidemia.      IMPRESSION, REPORT AND PLAN:   1.  Coronary artery disease status post prior MI with stenting of the diagonal.   2.  Hypertension.   3.  Hyperlipidemia.      She is doing quite well from a cardiac point of view and denies any symptoms at this point.  She is active and exercises regularly.  I reviewed her most recent lipid profile from today which showed an LDL of 104, total cholesterol of 170 and HDL of 50.      Although she is on high-dose of rosuvastatin, we have room to improve in her cholesterol.  I recommended that she continue with her exercise program and to lose more weight.  I think with additional 10-15 pound weight loss, we should be able to get her LDL close to 70.      It was a pleasure seeing Ms. Mack in clinic this morning.  I will see her in approximately 1-2 years for followup.  I appreciate the opportunity to take  part in her care.         MARY ALLAN MD             D: 2019   T: 2019   MT: SABRINA      Name:     LIEN TOMAS   MRN:      [id number]        Account:      [id number]   :      1965           Service Date: 2019      Document: [id number]           Outpatient Encounter Medications as of 5/3/2019   Medication Sig Dispense Refill     aspirin 81 MG EC tablet Take 1 tablet (81 mg) by mouth daily 10 tablet 0     nitroGLYcerin (NITROSTAT) 0.4 MG sublingual tablet Place 1 tablet (0.4 mg) under the tongue every 5 minutes as needed for chest pain 25 tablet 1     rosuvastatin (CRESTOR) 40 MG tablet Take 1 tablet (40 mg) by mouth daily 90 tablet 0     [DISCONTINUED] metoprolol succinate ER (TOPROL-XL) 25 MG 24 hr tablet Take 1 tablet (25 mg) by mouth daily 90 tablet 0     No facility-administered encounter medications on file as of 5/3/2019.        Again, thank you for allowing me to participate in the care of your patient.      Sincerely,    Mary Allan MD, MD     [facility]

## 2019-06-19 DIAGNOSIS — I25.10 CAD (CORONARY ARTERY DISEASE): ICD-10-CM

## 2019-06-19 RX ORDER — METOPROLOL SUCCINATE 25 MG/1
25 TABLET, EXTENDED RELEASE ORAL DAILY
Qty: 90 TABLET | Refills: 3 | Status: SHIPPED | OUTPATIENT
Start: 2019-06-19 | End: 2020-06-10

## 2019-07-15 DIAGNOSIS — E78.2 MIXED HYPERLIPIDEMIA: ICD-10-CM

## 2019-07-15 DIAGNOSIS — E78.5 HYPERLIPIDEMIA LDL GOAL <70: ICD-10-CM

## 2019-07-15 RX ORDER — ROSUVASTATIN CALCIUM 40 MG/1
40 TABLET, COATED ORAL DAILY
Qty: 90 TABLET | Refills: 3 | Status: SHIPPED | OUTPATIENT
Start: 2019-07-15 | End: 2020-07-14

## 2020-06-10 DIAGNOSIS — I25.10 CAD (CORONARY ARTERY DISEASE): ICD-10-CM

## 2020-06-10 RX ORDER — METOPROLOL SUCCINATE 25 MG/1
25 TABLET, EXTENDED RELEASE ORAL DAILY
Qty: 90 TABLET | Refills: 3 | Status: SHIPPED | OUTPATIENT
Start: 2020-06-10 | End: 2021-06-16

## 2020-07-14 DIAGNOSIS — E78.5 HYPERLIPIDEMIA LDL GOAL <70: ICD-10-CM

## 2020-07-14 DIAGNOSIS — E78.2 MIXED HYPERLIPIDEMIA: ICD-10-CM

## 2020-07-14 RX ORDER — ROSUVASTATIN CALCIUM 40 MG/1
40 TABLET, COATED ORAL DAILY
Qty: 90 TABLET | Refills: 3 | Status: SHIPPED | OUTPATIENT
Start: 2020-07-14 | End: 2021-07-08

## 2021-06-16 DIAGNOSIS — I25.10 CAD (CORONARY ARTERY DISEASE): ICD-10-CM

## 2021-06-16 RX ORDER — METOPROLOL SUCCINATE 25 MG/1
25 TABLET, EXTENDED RELEASE ORAL DAILY
Qty: 90 TABLET | Refills: 0 | Status: SHIPPED | OUTPATIENT
Start: 2021-06-16 | End: 2021-07-30

## 2021-07-08 DIAGNOSIS — E78.5 HYPERLIPIDEMIA LDL GOAL <70: ICD-10-CM

## 2021-07-08 DIAGNOSIS — E78.2 MIXED HYPERLIPIDEMIA: ICD-10-CM

## 2021-07-08 RX ORDER — ROSUVASTATIN CALCIUM 40 MG/1
40 TABLET, COATED ORAL DAILY
Qty: 90 TABLET | Refills: 0 | Status: SHIPPED | OUTPATIENT
Start: 2021-07-08 | End: 2021-07-30

## 2021-07-30 ENCOUNTER — TELEPHONE (OUTPATIENT)
Dept: CARDIOLOGY | Facility: CLINIC | Age: 56
End: 2021-07-30

## 2021-07-30 DIAGNOSIS — E78.2 MIXED HYPERLIPIDEMIA: ICD-10-CM

## 2021-07-30 DIAGNOSIS — E78.5 HYPERLIPIDEMIA LDL GOAL <70: Primary | ICD-10-CM

## 2021-07-30 DIAGNOSIS — I25.10 CAD (CORONARY ARTERY DISEASE): ICD-10-CM

## 2021-07-30 DIAGNOSIS — E78.5 HYPERLIPIDEMIA LDL GOAL <70: ICD-10-CM

## 2021-07-30 RX ORDER — METOPROLOL SUCCINATE 25 MG/1
25 TABLET, EXTENDED RELEASE ORAL DAILY
Qty: 90 TABLET | Refills: 1 | Status: SHIPPED | OUTPATIENT
Start: 2021-07-30 | End: 2021-10-12

## 2021-07-30 RX ORDER — ROSUVASTATIN CALCIUM 40 MG/1
40 TABLET, COATED ORAL DAILY
Qty: 90 TABLET | Refills: 1 | Status: SHIPPED | OUTPATIENT
Start: 2021-07-30 | End: 2021-10-12

## 2021-07-30 NOTE — TELEPHONE ENCOUNTER
Patient scheduled to see Dr. Allan 10/2021.    Will refill her Crestor and Toprol prior to that visit.  Order placed for Lipid profile.

## 2021-10-06 ENCOUNTER — LAB (OUTPATIENT)
Dept: LAB | Facility: CLINIC | Age: 56
End: 2021-10-06
Payer: COMMERCIAL

## 2021-10-06 DIAGNOSIS — E78.5 HYPERLIPIDEMIA LDL GOAL <70: ICD-10-CM

## 2021-10-06 LAB
CHOLEST SERPL-MCNC: 202 MG/DL
FASTING STATUS PATIENT QL REPORTED: YES
HDLC SERPL-MCNC: 51 MG/DL
LDLC SERPL CALC-MCNC: 133 MG/DL
NONHDLC SERPL-MCNC: 151 MG/DL
TRIGL SERPL-MCNC: 89 MG/DL

## 2021-10-06 PROCEDURE — 36415 COLL VENOUS BLD VENIPUNCTURE: CPT | Performed by: INTERNAL MEDICINE

## 2021-10-06 PROCEDURE — 80061 LIPID PANEL: CPT | Performed by: INTERNAL MEDICINE

## 2021-10-12 ENCOUNTER — OFFICE VISIT (OUTPATIENT)
Dept: CARDIOLOGY | Facility: CLINIC | Age: 56
End: 2021-10-12
Payer: COMMERCIAL

## 2021-10-12 VITALS
OXYGEN SATURATION: 97 % | HEART RATE: 73 BPM | BODY MASS INDEX: 32.45 KG/M2 | DIASTOLIC BLOOD PRESSURE: 93 MMHG | WEIGHT: 195 LBS | SYSTOLIC BLOOD PRESSURE: 162 MMHG

## 2021-10-12 DIAGNOSIS — E78.2 MIXED HYPERLIPIDEMIA: ICD-10-CM

## 2021-10-12 DIAGNOSIS — E78.5 HYPERLIPIDEMIA LDL GOAL <70: ICD-10-CM

## 2021-10-12 DIAGNOSIS — I25.10 CORONARY ARTERY DISEASE INVOLVING NATIVE CORONARY ARTERY OF NATIVE HEART WITHOUT ANGINA PECTORIS: Primary | ICD-10-CM

## 2021-10-12 PROCEDURE — 99214 OFFICE O/P EST MOD 30 MIN: CPT | Performed by: INTERNAL MEDICINE

## 2021-10-12 RX ORDER — ROSUVASTATIN CALCIUM 40 MG/1
40 TABLET, COATED ORAL DAILY
Qty: 90 TABLET | Refills: 1 | Status: SHIPPED | OUTPATIENT
Start: 2021-10-12 | End: 2022-05-13

## 2021-10-12 RX ORDER — METOPROLOL SUCCINATE 25 MG/1
25 TABLET, EXTENDED RELEASE ORAL DAILY
Qty: 90 TABLET | Refills: 1 | Status: SHIPPED | OUTPATIENT
Start: 2021-10-12 | End: 2022-07-07

## 2021-10-12 NOTE — PROGRESS NOTES
HPI and Plan:      REASON FOR CONSULTATION:  Coronary artery disease.       HISTORY OF PRESENT ILLNESS:  I had the pleasure of seeing Krupa Mack at the [facility] in [place] this morning.  She is a very pleasant 56-year-old female with known coronary artery disease status post prior lateral  myocardial infarction in 2002.  Coronary angiogram at that time demonstrated an occluded large diagonal branch.  This was successfully stented with 2 stents.  Echocardiogram post MI demonstrated EF of 55%.      Since her MI and intervention years ago, she has not had any cardiopulmonary symptoms.  She is active and denies chest pain or shortness of breath.  No palpitations, presyncope or syncope.  Her risk factors are reasonably well controlled with the exception of hyperlipidemia. Recent labs were reviewed and her LDL is higher.      IMPRESSION, REPORT AND PLAN:   1.  Coronary artery disease status post prior MI with stenting of the diagonal.   2.  Hypertension.   3.  Hyperlipidemia.      She is doing quite well from a cardiac point of view and denies any symptoms at this point.  She is active and exercises regularly.  I reviewed her most recent lipid profile from today which showed an LDL of 133 mg/dl.     Although she is on high-dose of rosuvastatin, we have room to improve in her cholesterol.  I recommended that she continue with her exercise program and to lose more weight.  I think with additional 20 pound weight loss, we should be able to get her LDL close to 70.  We might need add Zetia to her regimen in the future.      It was a pleasure seeing Ms. Mack in clinic this morning.  I will see her in 1 year for followup.  I appreciate the opportunity to take part in her care.         MARY GREGG MD        Orders Placed This Encounter   Procedures     Follow-Up with Cardiologist       Orders Placed This Encounter   Medications     rosuvastatin (CRESTOR) 40 MG tablet     Sig: Take 1 tablet (40 mg) by mouth  daily Please make an appointment for further refills. [phone]     Dispense:  90 tablet     Refill:  1     metoprolol succinate ER (TOPROL-XL) 25 MG 24 hr tablet     Sig: Take 1 tablet (25 mg) by mouth daily Appointment required for further refills     Dispense:  90 tablet     Refill:  1       Medications Discontinued During This Encounter   Medication Reason     metoprolol succinate ER (TOPROL-XL) 25 MG 24 hr tablet Reorder     rosuvastatin (CRESTOR) 40 MG tablet Reorder         Encounter Diagnoses   Name Primary?     Coronary artery disease involving native coronary artery of native heart without angina pectoris Yes     Mixed hyperlipidemia      Hyperlipidemia LDL goal <70        CURRENT MEDICATIONS:  Current Outpatient Medications   Medication Sig Dispense Refill     aspirin 81 MG EC tablet Take 1 tablet (81 mg) by mouth daily 10 tablet 0     metoprolol succinate ER (TOPROL-XL) 25 MG 24 hr tablet Take 1 tablet (25 mg) by mouth daily Appointment required for further refills 90 tablet 1     nitroGLYcerin (NITROSTAT) 0.4 MG sublingual tablet Place 1 tablet (0.4 mg) under the tongue every 5 minutes as needed for chest pain 25 tablet 1     rosuvastatin (CRESTOR) 40 MG tablet Take 1 tablet (40 mg) by mouth daily Please make an appointment for further refills. [phone] 90 tablet 1       ALLERGIES   No Known Allergies    PAST MEDICAL HISTORY:  Past Medical History:   Diagnosis Date     CAD (coronary artery disease) 09/01/2012     Dyslipidemia      HTN (hypertension)      Hyperlipidaemia      NSTEMI (non-ST elevated myocardial infarction) (H) 09/2012    s/p MECCA to diagonal     Obesity      Tobacco abuse     quit 9/25/2012       PAST SURGICAL HISTORY:  Past Surgical History:   Procedure Laterality Date     HEART CATH, ANGIOPLASTY  9/26/12    2.5 x16 mm Promus Element MECCA & 2.25 X 12 mm Promus Eement MECCA-lg diag branch     NO HISTORY OF SURGERY         FAMILY HISTORY:  Family History   Problem Relation Age of Onset      NELLY Father 75     Diabetes Father         teenager     Diabetes Mother 80     Diabetes Brother        SOCIAL HISTORY:  Social History     Socioeconomic History     Marital status:      Spouse name: None     Number of children: None     Years of education: None     Highest education level: None   Occupational History     None   Tobacco Use     Smoking status: Former Smoker     Smokeless tobacco: Never Used   Substance and Sexual Activity     Alcohol use: Yes     Comment: 2 glasses of wine per week     Drug use: No     Sexual activity: None   Other Topics Concern     Parent/sibling w/ CABG, MI or angioplasty before 65F 55M? Not Asked      Service Not Asked     Blood Transfusions Not Asked     Caffeine Concern Not Asked     Comment: 0-1 coffee or soda     Occupational Exposure Not Asked     Hobby Hazards Not Asked     Sleep Concern No     Stress Concern No     Weight Concern No     Special Diet Yes     Comment: Heart healthy      Back Care Not Asked     Exercise Yes     Comment: walking     Bike Helmet Not Asked     Seat Belt Yes     Self-Exams Not Asked   Social History Narrative    10/2012         Children-3     Work- [employer]-     Tobacco- quit 9/2012- smoked 1/2ppdx 28yrs    ETOH- 1/ month    Exercise- now- walking             Social Determinants of Health     Financial Resource Strain:      Difficulty of Paying Living Expenses:    Food Insecurity:      Worried About Running Out of Food in the Last Year:      Ran Out of Food in the Last Year:    Transportation Needs:      Lack of Transportation (Medical):      Lack of Transportation (Non-Medical):    Physical Activity:      Days of Exercise per Week:      Minutes of Exercise per Session:    Stress:      Feeling of Stress :    Social Connections:      Frequency of Communication with Friends and Family:      Frequency of Social Gatherings with Friends and Family:      Attends Faith Services:      Active Member of Clubs  or Organizations:      Attends Club or Organization Meetings:      Marital Status:    Intimate Partner Violence:      Fear of Current or Ex-Partner:      Emotionally Abused:      Physically Abused:      Sexually Abused:        Review of Systems:  Skin:  Negative       Eyes:  Negative      ENT:  Negative      Respiratory:  Negative       Cardiovascular:  Negative;palpitations;chest pain;syncope or near-syncope;cyanosis;lightheadedness;dizziness;exercise intolerance;fatigue;edema      Gastroenterology: Negative      Genitourinary:  Negative      Musculoskeletal:  Negative joint pain (occasional)    Neurologic:  Negative      Psychiatric:  Negative  (stress)    Heme/Lymph/Imm:  Negative weight loss    Endocrine:  Negative        Physical Exam:  Vitals: BP (!) 162/93   Pulse 73   Wt 88.5 kg (195 lb)   SpO2 97%   BMI 32.45 kg/m      Constitutional:  cooperative;in no acute distress        Skin:  warm and dry to the touch          Head:  normocephalic        Eyes:           Lymph:No Cervical lymphadenopathy present     ENT:  no pallor or cyanosis        Neck:  carotid pulses are full and equal bilaterally;JVP normal        Respiratory:  clear to auscultation         Cardiac: regular rhythm;normal S1 and S2;no murmurs, gallops or rubs detected     no presence of murmur          pulses full and equal                                        GI:  abdomen soft;non-tender        Extremities and Muscular Skeletal:  no deformities, clubbing, cyanosis, erythema observed              Neurological:  no gross motor deficits        Psych:  Alert and Oriented x 3        CC  No referring provider defined for this encounter.

## 2021-10-12 NOTE — LETTER
10/12/2021    Karuna Bell MD, MD  [address]  [facility] [address]    RE: Krupa Mack       Dear Colleague,    I had the pleasure of seeing Krupa Mack in the [facility].    HPI and Plan:      REASON FOR CONSULTATION:  Coronary artery disease.       HISTORY OF PRESENT ILLNESS:  I had the pleasure of seeing Krupa Mack at the [facility] in [place] this morning.  She is a very pleasant 56-year-old female with known coronary artery disease status post prior lateral  myocardial infarction in 2002.  Coronary angiogram at that time demonstrated an occluded large diagonal branch.  This was successfully stented with 2 stents.  Echocardiogram post MI demonstrated EF of 55%.      Since her MI and intervention years ago, she has not had any cardiopulmonary symptoms.  She is active and denies chest pain or shortness of breath.  No palpitations, presyncope or syncope.  Her risk factors are reasonably well controlled with the exception of hyperlipidemia. Recent labs were reviewed and her LDL is higher.      IMPRESSION, REPORT AND PLAN:   1.  Coronary artery disease status post prior MI with stenting of the diagonal.   2.  Hypertension.   3.  Hyperlipidemia.      She is doing quite well from a cardiac point of view and denies any symptoms at this point.  She is active and exercises regularly.  I reviewed her most recent lipid profile from today which showed an LDL of 133 mg/dl.     Although she is on high-dose of rosuvastatin, we have room to improve in her cholesterol.  I recommended that she continue with her exercise program and to lose more weight.  I think with additional 20 pound weight loss, we should be able to get her LDL close to 70.  We might need add Zetia to her regimen in the future.      It was a pleasure seeing Ms. Mack in clinic this morning.  I will see her in 1 year for followup.  I appreciate the opportunity to take  part in her care.         MARY GREGG MD        Orders Placed This Encounter   Procedures     Follow-Up with Cardiologist       Orders Placed This Encounter   Medications     rosuvastatin (CRESTOR) 40 MG tablet     Sig: Take 1 tablet (40 mg) by mouth daily Please make an appointment for further refills. [phone]     Dispense:  90 tablet     Refill:  1     metoprolol succinate ER (TOPROL-XL) 25 MG 24 hr tablet     Sig: Take 1 tablet (25 mg) by mouth daily Appointment required for further refills     Dispense:  90 tablet     Refill:  1       Medications Discontinued During This Encounter   Medication Reason     metoprolol succinate ER (TOPROL-XL) 25 MG 24 hr tablet Reorder     rosuvastatin (CRESTOR) 40 MG tablet Reorder         Encounter Diagnoses   Name Primary?     Coronary artery disease involving native coronary artery of native heart without angina pectoris Yes     Mixed hyperlipidemia      Hyperlipidemia LDL goal <70        CURRENT MEDICATIONS:  Current Outpatient Medications   Medication Sig Dispense Refill     aspirin 81 MG EC tablet Take 1 tablet (81 mg) by mouth daily 10 tablet 0     metoprolol succinate ER (TOPROL-XL) 25 MG 24 hr tablet Take 1 tablet (25 mg) by mouth daily Appointment required for further refills 90 tablet 1     nitroGLYcerin (NITROSTAT) 0.4 MG sublingual tablet Place 1 tablet (0.4 mg) under the tongue every 5 minutes as needed for chest pain 25 tablet 1     rosuvastatin (CRESTOR) 40 MG tablet Take 1 tablet (40 mg) by mouth daily Please make an appointment for further refills. [phone] 90 tablet 1       ALLERGIES   No Known Allergies    PAST MEDICAL HISTORY:  Past Medical History:   Diagnosis Date     CAD (coronary artery disease) 09/01/2012     Dyslipidemia      HTN (hypertension)      Hyperlipidaemia      NSTEMI (non-ST elevated myocardial infarction) (H) 09/2012    s/p MECCA to diagonal     Obesity      Tobacco abuse     quit 9/25/2012       PAST SURGICAL HISTORY:  Past Surgical  History:   Procedure Laterality Date     HEART CATH, ANGIOPLASTY  9/26/12    2.5 x16 mm Promus Element MECCA & 2.25 X 12 mm Promus Eement MECCA-lg diag branch     NO HISTORY OF SURGERY         FAMILY HISTORY:  Family History   Problem Relation Age of Onset     C.A.D. Father 75     Diabetes Father         teenager     Diabetes Mother 80     Diabetes Brother        SOCIAL HISTORY:  Social History     Socioeconomic History     Marital status:      Spouse name: None     Number of children: None     Years of education: None     Highest education level: None   Occupational History     None   Tobacco Use     Smoking status: Former Smoker     Smokeless tobacco: Never Used   Substance and Sexual Activity     Alcohol use: Yes     Comment: 2 glasses of wine per week     Drug use: No     Sexual activity: None   Other Topics Concern     Parent/sibling w/ CABG, MI or angioplasty before 65F 55M? Not Asked      Service Not Asked     Blood Transfusions Not Asked     Caffeine Concern Not Asked     Comment: 0-1 coffee or soda     Occupational Exposure Not Asked     Hobby Hazards Not Asked     Sleep Concern No     Stress Concern No     Weight Concern No     Special Diet Yes     Comment: Heart healthy      Back Care Not Asked     Exercise Yes     Comment: walking     Bike Helmet Not Asked     Seat Belt Yes     Self-Exams Not Asked   Social History Narrative    10/2012         Children-3     Work- [employer]-     Tobacco- quit 9/2012- smoked 1/2ppdx 28yrs    ETOH- 1/ month    Exercise- now- walking             Social Determinants of Health     Financial Resource Strain:      Difficulty of Paying Living Expenses:    Food Insecurity:      Worried About Running Out of Food in the Last Year:      Ran Out of Food in the Last Year:    Transportation Needs:      Lack of Transportation (Medical):      Lack of Transportation (Non-Medical):    Physical Activity:      Days of Exercise per Week:      Minutes of  Exercise per Session:    Stress:      Feeling of Stress :    Social Connections:      Frequency of Communication with Friends and Family:      Frequency of Social Gatherings with Friends and Family:      Attends Orthodoxy Services:      Active Member of Clubs or Organizations:      Attends Club or Organization Meetings:      Marital Status:    Intimate Partner Violence:      Fear of Current or Ex-Partner:      Emotionally Abused:      Physically Abused:      Sexually Abused:        Review of Systems:  Skin:  Negative       Eyes:  Negative      ENT:  Negative      Respiratory:  Negative       Cardiovascular:  Negative;palpitations;chest pain;syncope or near-syncope;cyanosis;lightheadedness;dizziness;exercise intolerance;fatigue;edema      Gastroenterology: Negative      Genitourinary:  Negative      Musculoskeletal:  Negative joint pain (occasional)    Neurologic:  Negative      Psychiatric:  Negative  (stress)    Heme/Lymph/Imm:  Negative weight loss    Endocrine:  Negative        Physical Exam:  Vitals: BP (!) 162/93   Pulse 73   Wt 88.5 kg (195 lb)   SpO2 97%   BMI 32.45 kg/m      Constitutional:  cooperative;in no acute distress        Skin:  warm and dry to the touch          Head:  normocephalic        Eyes:           Lymph:No Cervical lymphadenopathy present     ENT:  no pallor or cyanosis        Neck:  carotid pulses are full and equal bilaterally;JVP normal        Respiratory:  clear to auscultation         Cardiac: regular rhythm;normal S1 and S2;no murmurs, gallops or rubs detected     no presence of murmur          pulses full and equal                                        GI:  abdomen soft;non-tender        Extremities and Muscular Skeletal:  no deformities, clubbing, cyanosis, erythema observed              Neurological:  no gross motor deficits        Psych:  Alert and Oriented x 3        CC  No referring provider defined for this encounter.                  Thank you for allowing me to  participate in the care of your patient.      Sincerely,     Arya Allan MD, MD     [facility]  cc:   No referring provider defined for this encounter.

## 2022-05-13 DIAGNOSIS — E78.5 HYPERLIPIDEMIA LDL GOAL <70: ICD-10-CM

## 2022-05-13 DIAGNOSIS — E78.2 MIXED HYPERLIPIDEMIA: ICD-10-CM

## 2022-05-13 RX ORDER — ROSUVASTATIN CALCIUM 40 MG/1
40 TABLET, COATED ORAL DAILY
Qty: 90 TABLET | Refills: 1 | Status: SHIPPED | OUTPATIENT
Start: 2022-05-13 | End: 2022-11-09

## 2022-06-29 ENCOUNTER — TELEPHONE (OUTPATIENT)
Dept: CARDIOLOGY | Facility: CLINIC | Age: 57
End: 2022-06-29

## 2022-06-29 NOTE — TELEPHONE ENCOUNTER
Called patient to review recent elevated blood pressure reading.  Per [place] Community Measures guidelines, patients blood pressure is out of parameters and recheck blood pressure is recommended.    Patient will go to [facility] pharmacy and call back with b/p reading

## 2022-07-07 ENCOUNTER — TELEPHONE (OUTPATIENT)
Dept: CARDIOLOGY | Facility: CLINIC | Age: 57
End: 2022-07-07

## 2022-07-07 DIAGNOSIS — I25.10 CORONARY ARTERY DISEASE INVOLVING NATIVE CORONARY ARTERY OF NATIVE HEART WITHOUT ANGINA PECTORIS: ICD-10-CM

## 2022-07-07 RX ORDER — METOPROLOL SUCCINATE 25 MG/1
25 TABLET, EXTENDED RELEASE ORAL DAILY
Qty: 90 TABLET | Refills: 1 | Status: SHIPPED | OUTPATIENT
Start: 2022-07-07 | End: 2022-11-17

## 2022-07-07 NOTE — TELEPHONE ENCOUNTER
Called and spoke with pt. Per pt she has about 10 days of metoprolol left, prescription sent to pharmacy. Per pt she does not yet need a refill of atorvastatin and will contact her pharmacy when she is closer to needing a refill.     Received separate telephone encounter requesting lab orders, orders placed for labs.

## 2022-07-07 NOTE — TELEPHONE ENCOUNTER
[facility] Call Center    Phone Message    May a detailed message be left on voicemail: yes     Reason for Call: Medication Refill Request    Has the patient contacted the pharmacy for the refill? Yes   Name of medication being requested: metoprolol succinate ER (TOPROL-XL) 25 MG 24 hr tablet  rosuvastatin (CRESTOR) 40 MG tablet  Provider who prescribed the medication: Dr Allan  Pharmacy: [facility] [id number] [address]  Date medication is needed: Not urgent, pt will run out before appt on 10.28.22    Krupa called and schedule her follow-up with Dr Allan for 10.28.22. She will run out of metoprolol succinate ER (TOPROL-XL) 25 MG 24 hr tablet and rosuvastatin (CRESTOR) 40 MG tablet before this appointment. Please send new scripts to her pharmacy. Thank you!    Action Taken: Other: Cardiology    Travel Screening: Not Applicable

## 2022-07-07 NOTE — TELEPHONE ENCOUNTER
Health Call Center    Phone Message    May a detailed message be left on voicemail: yes     Reason for Call: Other: Krupa called and scheduled her follow-up with Dr Allan for 10.28.22. She said she would need labs so I scheduled her for 10.26.22. Please place any necessary lab orders and inform Krupa if she will need to fast. Thank you!     Action Taken: Other: Cardiology    Travel Screening: Not Applicable

## 2022-08-09 ENCOUNTER — TELEPHONE (OUTPATIENT)
Dept: CARDIOLOGY | Facility: CLINIC | Age: 57
End: 2022-08-09

## 2022-08-11 ENCOUNTER — TELEPHONE (OUTPATIENT)
Dept: CARDIOLOGY | Facility: CLINIC | Age: 57
End: 2022-08-11

## 2022-08-11 NOTE — TELEPHONE ENCOUNTER
Patient returned call and left voicemail message with update blood pressure reading.      Last Blood Pressure: 162/93  Last Heart Rate: 73  Date: 10/12/21  Location: [facility]    Today's Blood Pressure: 129/85  Today's Heart Rate: n/a  Location: Home BP    Patient reported blood pressure updated in Epic. Blood pressure falls within [place] Community Measures guidelines.  Patient will follow up as previously advised.

## 2022-11-09 ENCOUNTER — LAB (OUTPATIENT)
Dept: LAB | Facility: CLINIC | Age: 57
End: 2022-11-09
Payer: COMMERCIAL

## 2022-11-09 DIAGNOSIS — E78.5 HYPERLIPIDEMIA LDL GOAL <70: ICD-10-CM

## 2022-11-09 DIAGNOSIS — E78.2 MIXED HYPERLIPIDEMIA: ICD-10-CM

## 2022-11-09 DIAGNOSIS — I25.10 CORONARY ARTERY DISEASE INVOLVING NATIVE CORONARY ARTERY OF NATIVE HEART WITHOUT ANGINA PECTORIS: ICD-10-CM

## 2022-11-09 LAB
ALT SERPL W P-5'-P-CCNC: 47 U/L (ref 0–50)
ANION GAP SERPL CALCULATED.3IONS-SCNC: 4 MMOL/L (ref 3–14)
BUN SERPL-MCNC: 15 MG/DL (ref 7–30)
CALCIUM SERPL-MCNC: 9 MG/DL (ref 8.5–10.1)
CHLORIDE BLD-SCNC: 110 MMOL/L (ref 94–109)
CHOLEST SERPL-MCNC: 200 MG/DL
CO2 SERPL-SCNC: 28 MMOL/L (ref 20–32)
CREAT SERPL-MCNC: 0.54 MG/DL (ref 0.52–1.04)
FASTING STATUS PATIENT QL REPORTED: YES
GFR SERPL CREATININE-BSD FRML MDRD: >90 ML/MIN/1.73M2
GLUCOSE BLD-MCNC: 107 MG/DL (ref 70–99)
HDLC SERPL-MCNC: 45 MG/DL
LDLC SERPL CALC-MCNC: 136 MG/DL
NONHDLC SERPL-MCNC: 155 MG/DL
POTASSIUM BLD-SCNC: 4.7 MMOL/L (ref 3.4–5.3)
SODIUM SERPL-SCNC: 142 MMOL/L (ref 133–144)
TRIGL SERPL-MCNC: 93 MG/DL

## 2022-11-09 PROCEDURE — 80048 BASIC METABOLIC PNL TOTAL CA: CPT | Performed by: INTERNAL MEDICINE

## 2022-11-09 PROCEDURE — 84460 ALANINE AMINO (ALT) (SGPT): CPT | Performed by: INTERNAL MEDICINE

## 2022-11-09 PROCEDURE — 36415 COLL VENOUS BLD VENIPUNCTURE: CPT | Performed by: INTERNAL MEDICINE

## 2022-11-09 PROCEDURE — 80061 LIPID PANEL: CPT | Performed by: INTERNAL MEDICINE

## 2022-11-09 RX ORDER — ROSUVASTATIN CALCIUM 40 MG/1
40 TABLET, COATED ORAL DAILY
Qty: 90 TABLET | Refills: 0 | Status: SHIPPED | OUTPATIENT
Start: 2022-11-09 | End: 2022-11-17

## 2022-11-17 ENCOUNTER — OFFICE VISIT (OUTPATIENT)
Dept: CARDIOLOGY | Facility: CLINIC | Age: 57
End: 2022-11-17
Payer: COMMERCIAL

## 2022-11-17 VITALS
DIASTOLIC BLOOD PRESSURE: 78 MMHG | BODY MASS INDEX: 33.82 KG/M2 | SYSTOLIC BLOOD PRESSURE: 140 MMHG | HEART RATE: 81 BPM | HEIGHT: 65 IN | WEIGHT: 203 LBS | OXYGEN SATURATION: 99 %

## 2022-11-17 DIAGNOSIS — E78.5 HYPERLIPIDEMIA LDL GOAL <70: ICD-10-CM

## 2022-11-17 DIAGNOSIS — E78.2 MIXED HYPERLIPIDEMIA: ICD-10-CM

## 2022-11-17 DIAGNOSIS — I25.10 CORONARY ARTERY DISEASE INVOLVING NATIVE CORONARY ARTERY OF NATIVE HEART WITHOUT ANGINA PECTORIS: Primary | ICD-10-CM

## 2022-11-17 PROCEDURE — 99214 OFFICE O/P EST MOD 30 MIN: CPT | Performed by: INTERNAL MEDICINE

## 2022-11-17 RX ORDER — NITROGLYCERIN 0.4 MG/1
0.4 TABLET SUBLINGUAL EVERY 5 MIN PRN
Qty: 25 TABLET | Refills: 1 | Status: SHIPPED | OUTPATIENT
Start: 2022-11-17 | End: 2024-05-01

## 2022-11-17 RX ORDER — EZETIMIBE 10 MG/1
10 TABLET ORAL DAILY
Qty: 90 TABLET | Refills: 3 | Status: SHIPPED | OUTPATIENT
Start: 2022-11-17 | End: 2023-08-21

## 2022-11-17 RX ORDER — METOPROLOL SUCCINATE 25 MG/1
25 TABLET, EXTENDED RELEASE ORAL DAILY
Qty: 90 TABLET | Refills: 3 | Status: SHIPPED | OUTPATIENT
Start: 2022-11-17 | End: 2024-01-16

## 2022-11-17 RX ORDER — ROSUVASTATIN CALCIUM 40 MG/1
40 TABLET, COATED ORAL DAILY
Qty: 90 TABLET | Refills: 3 | Status: SHIPPED | OUTPATIENT
Start: 2022-11-17 | End: 2023-12-01

## 2022-11-17 NOTE — PROGRESS NOTES
REASON FOR CONSULTATION:  Coronary artery disease.       HISTORY OF PRESENT ILLNESS:  I had the pleasure of seeing Krupa Mack at the [facility] in [place] this afternoon.  She is a very pleasant 56-year-old female with known coronary artery disease status post prior lateral  myocardial infarction in 2002.  Coronary angiogram at that time demonstrated an occluded large diagonal branch.  This was successfully stented with 2 stents.  Echocardiogram post MI demonstrated EF of 55%.      Since her MI and intervention 10 years ago, she has not had any cardiopulmonary symptoms.  She is active and denies chest pain or shortness of breath.  No palpitations, presyncope or syncope.  Her risk factors are reasonably well controlled with the exception of hyperlipidemia. Recent labs were reviewed and her LDL is higher.      IMPRESSION, REPORT AND PLAN:   1.  Coronary artery disease status post prior MI with stenting of the diagonal.   2.  Hypertension.   3.  Hyperlipidemia.      She is doing quite well from a cardiac point of view and denies any symptoms at this point.  She is active but does not exercise.  She is hoping to resume an exercise program again.  She has not had LV function assessment in 10 years therefore we will obtain an echocardiogram.     Her LDL remains persistently elevated.  We will add Zetia to her regimen.  Also encouraged that she lose weight.     It was a pleasure seeing Ms. Mack in clinic this morning.  I will see her in 1 year for followup.  I appreciate the opportunity to take part in her care.         MARY GREGG MD        Today's clinic visit entailed:  30 minutes spent on the date of the encounter doing chart review, history and exam, documentation and further activities per the note  Provider  Link to [facility] Help Grid       Orders Placed This Encounter   Procedures     Echocardiogram Complete       Orders Placed This Encounter   Medications     nitroGLYcerin (NITROSTAT) 0.4 MG  sublingual tablet     Sig: Place 1 tablet (0.4 mg) under the tongue every 5 minutes as needed for chest pain     Dispense:  25 tablet     Refill:  1     metoprolol succinate ER (TOPROL XL) 25 MG 24 hr tablet     Sig: Take 1 tablet (25 mg) by mouth daily     Dispense:  90 tablet     Refill:  3     rosuvastatin (CRESTOR) 40 MG tablet     Sig: Take 1 tablet (40 mg) by mouth daily     Dispense:  90 tablet     Refill:  3     ezetimibe (ZETIA) 10 MG tablet     Sig: Take 1 tablet (10 mg) by mouth daily     Dispense:  90 tablet     Refill:  3       Medications Discontinued During This Encounter   Medication Reason     nitroGLYcerin (NITROSTAT) 0.4 MG sublingual tablet Reorder     metoprolol succinate ER (TOPROL XL) 25 MG 24 hr tablet Reorder     rosuvastatin (CRESTOR) 40 MG tablet Reorder         Encounter Diagnoses   Name Primary?     Hyperlipidemia LDL goal <70      Coronary artery disease involving native coronary artery of native heart without angina pectoris Yes     Mixed hyperlipidemia        CURRENT MEDICATIONS:  Current Outpatient Medications   Medication Sig Dispense Refill     aspirin 81 MG EC tablet Take 1 tablet (81 mg) by mouth daily 10 tablet 0     ezetimibe (ZETIA) 10 MG tablet Take 1 tablet (10 mg) by mouth daily 90 tablet 3     metoprolol succinate ER (TOPROL XL) 25 MG 24 hr tablet Take 1 tablet (25 mg) by mouth daily 90 tablet 3     nitroGLYcerin (NITROSTAT) 0.4 MG sublingual tablet Place 1 tablet (0.4 mg) under the tongue every 5 minutes as needed for chest pain 25 tablet 1     rosuvastatin (CRESTOR) 40 MG tablet Take 1 tablet (40 mg) by mouth daily 90 tablet 3       ALLERGIES   No Known Allergies    PAST MEDICAL HISTORY:  Past Medical History:   Diagnosis Date     CAD (coronary artery disease) 09/01/2012     Dyslipidemia      HTN (hypertension)      Hyperlipidaemia      NSTEMI (non-ST elevated myocardial infarction) (H) 09/2012    s/p MECCA to diagonal     Obesity      Tobacco abuse     quit 9/25/2012  "      PAST SURGICAL HISTORY:  Past Surgical History:   Procedure Laterality Date     HEART CATH, ANGIOPLASTY  9/26/12    2.5 x16 mm Promus Element MECCA & 2.25 X 12 mm Promus Eement MECCA-lg diag branch     NO HISTORY OF SURGERY         FAMILY HISTORY:  Family History   Problem Relation Age of Onset     C.A.D. Father 75     Diabetes Father         teenager     Diabetes Mother 80     Diabetes Brother        SOCIAL HISTORY:  Social History     Socioeconomic History     Marital status:      Spouse name: None     Number of children: None     Years of education: None     Highest education level: None   Tobacco Use     Smoking status: Former     Smokeless tobacco: Never   Substance and Sexual Activity     Alcohol use: Yes     Comment: couple times per week     Drug use: No   Other Topics Concern     Sleep Concern No     Stress Concern No     Weight Concern No     Special Diet Yes     Comment: Heart healthy      Exercise Yes     Comment: walking     Seat Belt Yes   Social History Narrative    10/2012         Children-3     Work- [employer]-     Tobacco- quit 9/2012- smoked 1/2ppdx 28yrs    ETOH- 1/ month    Exercise- now- walking               Review of Systems:  Skin:  not assessed       Eyes:  not assessed      ENT:  not assessed      Respiratory:  Negative sleep apnea;shortness of breath     Cardiovascular:  Negative;palpitations;chest pain;lightheadedness;dizziness;exercise intolerance;fatigue;edema;syncope or near-syncope      Gastroenterology: Negative heartburn;reflux    Genitourinary:  not assessed      Musculoskeletal:  not assessed  (occasional)    Neurologic:  Negative headaches;migraine headaches;numbness or tingling of feet;numbness or tingling of hands    Psychiatric:  not assessed  (stress)    Heme/Lymph/Imm:  not assessed      Endocrine:  Negative thyroid disorder;diabetes      Physical Exam:  Vitals: BP (!) 140/78   Pulse 81   Ht 1.651 m (5' 5\")   Wt 92.1 kg (203 lb)   SpO2 99%  "  BMI 33.78 kg/m      Constitutional:  cooperative;in no acute distress        Skin:  warm and dry to the touch          Head:  normocephalic        Eyes:           Lymph:No Cervical lymphadenopathy present     ENT:  no pallor or cyanosis        Neck:  carotid pulses are full and equal bilaterally;JVP normal        Respiratory:  clear to auscultation         Cardiac: regular rhythm;normal S1 and S2;no murmurs, gallops or rubs detected     no presence of murmur          pulses full and equal                                        GI:  abdomen soft;non-tender        Extremities and Muscular Skeletal:  no deformities, clubbing, cyanosis, erythema observed              Neurological:  no gross motor deficits        Psych:  Alert and Oriented x 3        CC  No referring provider defined for this encounter.

## 2022-11-17 NOTE — LETTER
11/17/2022    Karuna Bell MD, MD  [address]  [facility] [address]    RE: Krupa Mack       Dear Colleague,     I had the pleasure of seeing Krupa Mack in the [facility].  REASON FOR CONSULTATION:  Coronary artery disease.       HISTORY OF PRESENT ILLNESS:  I had the pleasure of seeing Krupa Mack at the [facility] in [place] this afternoon.  She is a very pleasant 56-year-old female with known coronary artery disease status post prior lateral  myocardial infarction in 2002.  Coronary angiogram at that time demonstrated an occluded large diagonal branch.  This was successfully stented with 2 stents.  Echocardiogram post MI demonstrated EF of 55%.      Since her MI and intervention 10 years ago, she has not had any cardiopulmonary symptoms.  She is active and denies chest pain or shortness of breath.  No palpitations, presyncope or syncope.  Her risk factors are reasonably well controlled with the exception of hyperlipidemia. Recent labs were reviewed and her LDL is higher.      IMPRESSION, REPORT AND PLAN:   1.  Coronary artery disease status post prior MI with stenting of the diagonal.   2.  Hypertension.   3.  Hyperlipidemia.      She is doing quite well from a cardiac point of view and denies any symptoms at this point.  She is active but does not exercise.  She is hoping to resume an exercise program again.  She has not had LV function assessment in 10 years therefore we will obtain an echocardiogram.     Her LDL remains persistently elevated.  We will add Zetia to her regimen.  Also encouraged that she lose weight.     It was a pleasure seeing Ms. Mack in clinic this morning.  I will see her in 1 year for followup.  I appreciate the opportunity to take part in her care.         MARY GREGG MD        Today's clinic visit entailed:  30 minutes spent on the date of the encounter doing chart review, history and exam, documentation and further activities  per the note  Provider  Link to [facility] Help Grid       Orders Placed This Encounter   Procedures     Echocardiogram Complete       Orders Placed This Encounter   Medications     nitroGLYcerin (NITROSTAT) 0.4 MG sublingual tablet     Sig: Place 1 tablet (0.4 mg) under the tongue every 5 minutes as needed for chest pain     Dispense:  25 tablet     Refill:  1     metoprolol succinate ER (TOPROL XL) 25 MG 24 hr tablet     Sig: Take 1 tablet (25 mg) by mouth daily     Dispense:  90 tablet     Refill:  3     rosuvastatin (CRESTOR) 40 MG tablet     Sig: Take 1 tablet (40 mg) by mouth daily     Dispense:  90 tablet     Refill:  3     ezetimibe (ZETIA) 10 MG tablet     Sig: Take 1 tablet (10 mg) by mouth daily     Dispense:  90 tablet     Refill:  3       Medications Discontinued During This Encounter   Medication Reason     nitroGLYcerin (NITROSTAT) 0.4 MG sublingual tablet Reorder     metoprolol succinate ER (TOPROL XL) 25 MG 24 hr tablet Reorder     rosuvastatin (CRESTOR) 40 MG tablet Reorder         Encounter Diagnoses   Name Primary?     Hyperlipidemia LDL goal <70      Coronary artery disease involving native coronary artery of native heart without angina pectoris Yes     Mixed hyperlipidemia        CURRENT MEDICATIONS:  Current Outpatient Medications   Medication Sig Dispense Refill     aspirin 81 MG EC tablet Take 1 tablet (81 mg) by mouth daily 10 tablet 0     ezetimibe (ZETIA) 10 MG tablet Take 1 tablet (10 mg) by mouth daily 90 tablet 3     metoprolol succinate ER (TOPROL XL) 25 MG 24 hr tablet Take 1 tablet (25 mg) by mouth daily 90 tablet 3     nitroGLYcerin (NITROSTAT) 0.4 MG sublingual tablet Place 1 tablet (0.4 mg) under the tongue every 5 minutes as needed for chest pain 25 tablet 1     rosuvastatin (CRESTOR) 40 MG tablet Take 1 tablet (40 mg) by mouth daily 90 tablet 3       ALLERGIES   No Known Allergies    PAST MEDICAL HISTORY:  Past Medical History:   Diagnosis Date     CAD (coronary artery disease)  09/01/2012     Dyslipidemia      HTN (hypertension)      Hyperlipidaemia      NSTEMI (non-ST elevated myocardial infarction) (H) 09/2012    s/p MECCA to diagonal     Obesity      Tobacco abuse     quit 9/25/2012       PAST SURGICAL HISTORY:  Past Surgical History:   Procedure Laterality Date     HEART CATH, ANGIOPLASTY  9/26/12    2.5 x16 mm Promus Element MECCA & 2.25 X 12 mm Promus Eement MECCA-lg diag branch     NO HISTORY OF SURGERY         FAMILY HISTORY:  Family History   Problem Relation Age of Onset     C.A.D. Father 75     Diabetes Father         teenager     Diabetes Mother 80     Diabetes Brother        SOCIAL HISTORY:  Social History     Socioeconomic History     Marital status:      Spouse name: None     Number of children: None     Years of education: None     Highest education level: None   Tobacco Use     Smoking status: Former     Smokeless tobacco: Never   Substance and Sexual Activity     Alcohol use: Yes     Comment: couple times per week     Drug use: No   Other Topics Concern     Sleep Concern No     Stress Concern No     Weight Concern No     Special Diet Yes     Comment: Heart healthy      Exercise Yes     Comment: walking     Seat Belt Yes   Social History Narrative    10/2012         Children-3     Work- [employer]-     Tobacco- quit 9/2012- smoked 1/2ppdx 28yrs    ETOH- 1/ month    Exercise- now- walking               Review of Systems:  Skin:  not assessed       Eyes:  not assessed      ENT:  not assessed      Respiratory:  Negative sleep apnea;shortness of breath     Cardiovascular:  Negative;palpitations;chest pain;lightheadedness;dizziness;exercise intolerance;fatigue;edema;syncope or near-syncope      Gastroenterology: Negative heartburn;reflux    Genitourinary:  not assessed      Musculoskeletal:  not assessed  (occasional)    Neurologic:  Negative headaches;migraine headaches;numbness or tingling of feet;numbness or tingling of hands    Psychiatric:  not  "assessed  (stress)    Heme/Lymph/Imm:  not assessed      Endocrine:  Negative thyroid disorder;diabetes      Physical Exam:  Vitals: BP (!) 140/78   Pulse 81   Ht 1.651 m (5' 5\")   Wt 92.1 kg (203 lb)   SpO2 99%   BMI 33.78 kg/m      Constitutional:  cooperative;in no acute distress        Skin:  warm and dry to the touch          Head:  normocephalic        Eyes:           Lymph:No Cervical lymphadenopathy present     ENT:  no pallor or cyanosis        Neck:  carotid pulses are full and equal bilaterally;JVP normal        Respiratory:  clear to auscultation         Cardiac: regular rhythm;normal S1 and S2;no murmurs, gallops or rubs detected     no presence of murmur          pulses full and equal                                        GI:  abdomen soft;non-tender        Extremities and Muscular Skeletal:  no deformities, clubbing, cyanosis, erythema observed              Neurological:  no gross motor deficits        Psych:  Alert and Oriented x 3        CC  No referring provider defined for this encounter.    Thank you for allowing me to participate in the care of your patient.      Sincerely,     Arya Allan MD, MD     [facility]  "

## 2022-11-30 ENCOUNTER — HOSPITAL ENCOUNTER (OUTPATIENT)
Dept: CARDIOLOGY | Facility: CLINIC | Age: 57
Discharge: HOME | End: 2022-11-30
Attending: INTERNAL MEDICINE | Admitting: INTERNAL MEDICINE
Payer: COMMERCIAL

## 2022-11-30 DIAGNOSIS — I25.10 CORONARY ARTERY DISEASE INVOLVING NATIVE CORONARY ARTERY OF NATIVE HEART WITHOUT ANGINA PECTORIS: ICD-10-CM

## 2022-11-30 LAB — LVEF ECHO: NORMAL

## 2022-11-30 PROCEDURE — 999N000208 ECHOCARDIOGRAM COMPLETE

## 2022-11-30 PROCEDURE — 255N000002 HC RX 255 OP 636: Performed by: INTERNAL MEDICINE

## 2022-11-30 PROCEDURE — 93306 TTE W/DOPPLER COMPLETE: CPT | Mod: 26 | Performed by: INTERNAL MEDICINE

## 2022-11-30 RX ADMIN — HUMAN ALBUMIN MICROSPHERES AND PERFLUTREN 9 ML: 10; .22 INJECTION, SOLUTION INTRAVENOUS at 08:44

## 2023-03-06 ENCOUNTER — TELEPHONE (OUTPATIENT)
Dept: CARDIOLOGY | Facility: CLINIC | Age: 58
End: 2023-03-06

## 2023-03-06 NOTE — TELEPHONE ENCOUNTER
[place] reviewed.  Patients recent blood pressure is outside of guideline parameters.  Called pt to review, no answer.  Left voicemail message asking patient to check their blood pressure using a home blood pressure cuff or by going to a [facility].  Patient instructed to then call [phone] (Shyla) and leave a message with their name, date of birth, and blood pressure reading that was completed within the last 24 hours and where it was completed.  Will await call back for further review.  MACRINA Waldrop

## 2023-08-21 DIAGNOSIS — E78.5 HYPERLIPIDEMIA LDL GOAL <70: ICD-10-CM

## 2023-08-21 DIAGNOSIS — I25.10 CORONARY ARTERY DISEASE INVOLVING NATIVE CORONARY ARTERY OF NATIVE HEART WITHOUT ANGINA PECTORIS: ICD-10-CM

## 2023-08-21 RX ORDER — EZETIMIBE 10 MG/1
10 TABLET ORAL DAILY
Qty: 90 TABLET | Refills: 0 | Status: SHIPPED | OUTPATIENT
Start: 2023-08-21 | End: 2023-11-20

## 2023-11-20 DIAGNOSIS — E78.5 HYPERLIPIDEMIA LDL GOAL <70: ICD-10-CM

## 2023-11-20 DIAGNOSIS — I25.10 CORONARY ARTERY DISEASE INVOLVING NATIVE CORONARY ARTERY OF NATIVE HEART WITHOUT ANGINA PECTORIS: Primary | ICD-10-CM

## 2023-11-20 DIAGNOSIS — I25.10 CORONARY ARTERY DISEASE INVOLVING NATIVE CORONARY ARTERY OF NATIVE HEART WITHOUT ANGINA PECTORIS: ICD-10-CM

## 2023-11-20 RX ORDER — EZETIMIBE 10 MG/1
10 TABLET ORAL DAILY
Qty: 90 TABLET | Refills: 0 | Status: SHIPPED | OUTPATIENT
Start: 2023-11-20 | End: 2024-02-15

## 2023-12-01 DIAGNOSIS — E78.2 MIXED HYPERLIPIDEMIA: ICD-10-CM

## 2023-12-01 DIAGNOSIS — E78.5 HYPERLIPIDEMIA LDL GOAL <70: ICD-10-CM

## 2023-12-01 RX ORDER — ROSUVASTATIN CALCIUM 40 MG/1
40 TABLET, COATED ORAL DAILY
Qty: 90 TABLET | Refills: 0 | Status: SHIPPED | OUTPATIENT
Start: 2023-12-01 | End: 2024-02-15

## 2024-01-16 DIAGNOSIS — I25.10 CORONARY ARTERY DISEASE INVOLVING NATIVE CORONARY ARTERY OF NATIVE HEART WITHOUT ANGINA PECTORIS: ICD-10-CM

## 2024-01-16 RX ORDER — METOPROLOL SUCCINATE 25 MG/1
25 TABLET, EXTENDED RELEASE ORAL DAILY
Qty: 90 TABLET | Refills: 4 | Status: SHIPPED | OUTPATIENT
Start: 2024-01-16 | End: 2024-05-01

## 2024-02-15 DIAGNOSIS — E78.5 HYPERLIPIDEMIA LDL GOAL <70: ICD-10-CM

## 2024-02-15 DIAGNOSIS — I25.10 CORONARY ARTERY DISEASE INVOLVING NATIVE CORONARY ARTERY OF NATIVE HEART WITHOUT ANGINA PECTORIS: ICD-10-CM

## 2024-02-15 DIAGNOSIS — E78.2 MIXED HYPERLIPIDEMIA: ICD-10-CM

## 2024-02-15 RX ORDER — EZETIMIBE 10 MG/1
10 TABLET ORAL DAILY
Qty: 90 TABLET | Refills: 0 | Status: SHIPPED | OUTPATIENT
Start: 2024-02-15 | End: 2024-05-01

## 2024-02-15 RX ORDER — ROSUVASTATIN CALCIUM 40 MG/1
40 TABLET, COATED ORAL DAILY
Qty: 90 TABLET | Refills: 0 | Status: SHIPPED | OUTPATIENT
Start: 2024-02-15 | End: 2024-05-01

## 2024-04-23 ENCOUNTER — TELEPHONE (OUTPATIENT)
Dept: CARDIOLOGY | Facility: CLINIC | Age: 59
End: 2024-04-23
Payer: COMMERCIAL

## 2024-04-23 DIAGNOSIS — I25.10 CORONARY ARTERY DISEASE INVOLVING NATIVE CORONARY ARTERY OF NATIVE HEART WITHOUT ANGINA PECTORIS: Primary | ICD-10-CM

## 2024-04-23 NOTE — TELEPHONE ENCOUNTER
Health Call Center    Phone Message    May a detailed message be left on voicemail: yes     Reason for Call: Other: Krupa called requesting labs be performed before her appointment with Dr. Allan on 5/1.  She wants to check her cholesterol as well as any other pertinent lab work Dr. Allan deems necessary.  Please call Krupa back to further discuss what labs are needed.     Action Taken: Other: Cardiology    Travel Screening: Not Applicable    Thank you!  [facility]

## 2024-04-23 NOTE — TELEPHONE ENCOUNTER
Patient called notifying of labs orders placed. Patient in agreement with plan and verbalized understanding. Patient call forwarded to scheduling.

## 2024-04-30 ENCOUNTER — LAB (OUTPATIENT)
Dept: LAB | Facility: CLINIC | Age: 59
End: 2024-04-30
Payer: COMMERCIAL

## 2024-04-30 DIAGNOSIS — I25.10 CORONARY ARTERY DISEASE INVOLVING NATIVE CORONARY ARTERY OF NATIVE HEART WITHOUT ANGINA PECTORIS: ICD-10-CM

## 2024-04-30 LAB
ALT SERPL W P-5'-P-CCNC: 50 U/L (ref 0–50)
ANION GAP SERPL CALCULATED.3IONS-SCNC: 13 MMOL/L (ref 7–15)
BUN SERPL-MCNC: 8.6 MG/DL (ref 8–23)
CALCIUM SERPL-MCNC: 9.4 MG/DL (ref 8.8–10.2)
CHLORIDE SERPL-SCNC: 108 MMOL/L (ref 98–107)
CHOLEST SERPL-MCNC: 165 MG/DL
CREAT SERPL-MCNC: 0.55 MG/DL (ref 0.51–0.95)
DEPRECATED HCO3 PLAS-SCNC: 23 MMOL/L (ref 22–29)
EGFRCR SERPLBLD CKD-EPI 2021: >90 ML/MIN/1.73M2
FASTING STATUS PATIENT QL REPORTED: YES
GLUCOSE SERPL-MCNC: 109 MG/DL (ref 70–99)
HDLC SERPL-MCNC: 55 MG/DL
LDLC SERPL CALC-MCNC: 93 MG/DL
NONHDLC SERPL-MCNC: 110 MG/DL
POTASSIUM SERPL-SCNC: 4.2 MMOL/L (ref 3.4–5.3)
SODIUM SERPL-SCNC: 144 MMOL/L (ref 135–145)
TRIGL SERPL-MCNC: 83 MG/DL

## 2024-04-30 PROCEDURE — 36415 COLL VENOUS BLD VENIPUNCTURE: CPT | Performed by: INTERNAL MEDICINE

## 2024-04-30 PROCEDURE — 80048 BASIC METABOLIC PNL TOTAL CA: CPT | Performed by: INTERNAL MEDICINE

## 2024-04-30 PROCEDURE — 80061 LIPID PANEL: CPT | Performed by: INTERNAL MEDICINE

## 2024-04-30 PROCEDURE — 84460 ALANINE AMINO (ALT) (SGPT): CPT | Performed by: INTERNAL MEDICINE

## 2024-05-01 ENCOUNTER — OFFICE VISIT (OUTPATIENT)
Dept: CARDIOLOGY | Facility: CLINIC | Age: 59
End: 2024-05-01
Payer: COMMERCIAL

## 2024-05-01 VITALS
WEIGHT: 198.6 LBS | HEIGHT: 65 IN | DIASTOLIC BLOOD PRESSURE: 90 MMHG | SYSTOLIC BLOOD PRESSURE: 169 MMHG | OXYGEN SATURATION: 96 % | BODY MASS INDEX: 33.09 KG/M2 | HEART RATE: 71 BPM

## 2024-05-01 DIAGNOSIS — I25.10 CORONARY ARTERY DISEASE INVOLVING NATIVE CORONARY ARTERY OF NATIVE HEART WITHOUT ANGINA PECTORIS: ICD-10-CM

## 2024-05-01 DIAGNOSIS — E78.2 MIXED HYPERLIPIDEMIA: ICD-10-CM

## 2024-05-01 DIAGNOSIS — E78.5 HYPERLIPIDEMIA LDL GOAL <70: ICD-10-CM

## 2024-05-01 PROCEDURE — 99214 OFFICE O/P EST MOD 30 MIN: CPT | Performed by: INTERNAL MEDICINE

## 2024-05-01 RX ORDER — ROSUVASTATIN CALCIUM 40 MG/1
40 TABLET, COATED ORAL DAILY
Qty: 90 TABLET | Refills: 3 | Status: SHIPPED | OUTPATIENT
Start: 2024-05-01

## 2024-05-01 RX ORDER — EZETIMIBE 10 MG/1
10 TABLET ORAL DAILY
Qty: 90 TABLET | Refills: 3 | Status: SHIPPED | OUTPATIENT
Start: 2024-05-01

## 2024-05-01 RX ORDER — NITROGLYCERIN 0.4 MG/1
0.4 TABLET SUBLINGUAL EVERY 5 MIN PRN
Qty: 25 TABLET | Refills: 1 | Status: SHIPPED | OUTPATIENT
Start: 2024-05-01

## 2024-05-01 RX ORDER — LISINOPRIL 20 MG/1
20 TABLET ORAL DAILY
Qty: 90 TABLET | Refills: 3 | Status: SHIPPED | OUTPATIENT
Start: 2024-05-01

## 2024-05-01 NOTE — LETTER
5/1/2024    Karuna Bell MD, MD  [address]  [facility] [address]    RE: Krupa Mack       Dear Colleague,     I had the pleasure of seeing Krupa Mack in the [facility].  CARDIOLOGY CLINIC CONSULTATION    PRIMARY CARE PHYSICIAN:  Karuna Bell MD    HISTORY OF PRESENT ILLNESS:  I had the pleasure of seeing Krupa Mack at the [facility] in [place] this morning. She is a very pleasant 59-year-old female with known coronary artery disease status post prior lateral  myocardial infarction in 2002.  Coronary angiogram at that time demonstrated an occluded large diagonal branch.  This was successfully stented with 2 stents.  Echocardiogram post MI demonstrated EF of 55%.      Since her MI and intervention, she has not had any cardiopulmonary symptoms.  She is active and denies chest pain or shortness of breath.  No palpitations, presyncope or syncope.     Her blood pressure has been elevated last few readings.  Her cholesterol is much better but still not optimal.    PAST MEDICAL HISTORY:  Past Medical History:   Diagnosis Date    CAD (coronary artery disease) 09/01/2012    Dyslipidemia     HTN (hypertension)     Hyperlipidaemia     NSTEMI (non-ST elevated myocardial infarction) (H) 09/2012    s/p MECCA to diagonal    Obesity     Tobacco abuse     quit 9/25/2012       MEDICATIONS:  Current Outpatient Medications   Medication Sig Dispense Refill    aspirin 81 MG EC tablet Take 1 tablet (81 mg) by mouth daily (Patient taking differently: Take 81 mg by mouth daily OTC) 10 tablet 0    ezetimibe (ZETIA) 10 MG tablet Take 1 tablet (10 mg) by mouth daily 90 tablet 3    lisinopril (ZESTRIL) 20 MG tablet Take 1 tablet (20 mg) by mouth daily 90 tablet 3    nitroGLYcerin (NITROSTAT) 0.4 MG sublingual tablet Place 1 tablet (0.4 mg) under the tongue every 5 minutes as needed for chest pain 25 tablet 1    rosuvastatin (CRESTOR) 40 MG tablet Take 1 tablet (40 mg) by mouth  daily 90 tablet 3     No current facility-administered medications for this visit.       SOCIAL HISTORY:  I have reviewed this patient's social history and updated it with pertinent information if needed. Krupa Mack  reports that she has been smoking cigarettes. She has never used smokeless tobacco. She reports current alcohol use. She reports that she does not use drugs.    PHYSICAL EXAM:  Pulse:  [71] 71  BP: (169)/(90) 169/90  SpO2:  [96 %] 96 %  198 lbs 9.6 oz    Constitutional: alert, no distress  Respiratory: Good bilateral air entry  Cardiovascular: Regular rate and rhythm, no murmur  GI: nondistended  Neuropsychiatric: appropriate affact    ASSESSMENT: Pertinent issues addressed/ reviewed during this cardiology visit  Coronary artery disease status post prior lateral MI and stenting of the diagonal, very stable  Hypertension, not well-controlled  Hyperlipidemia    RECOMMENDATIONS:  She is doing quite well from cardiac point of view.  No cardiopulmonary symptoms.  Will have her stop the metoprolol and start lisinopril 20 mg daily for better blood pressure control.  Advised her to keep an eye on her blood pressure at home and let us know if the blood pressure is still not optimal (systolic less than 140)  LDL is 93 which is significantly improved from prior measurements.  She is on maximum rosuvastatin and Zetia.  She has gained weight over the past couple years.  Recommend significant weight loss to further decrease LDL.  She might need PCSK9 inhibitor if we can get her LDL below 70 with lifestyle modifications.    It was a pleasure seeing this patient in clinic today. Please do not hesitate to contact me with any future questions.     Arya Allan MD, [facility]  Cardiology - [facility]  May 1, 2024    Review of the result(s) of each unique test - Last BMP, lipid profile, ECG     The level of medical decision making during this visit was of moderate complexity.    This note was completed in part using dictation  via the Dragon voice recognition software. Some word and grammatical errors may occur and must be interpreted in the appropriate clinical context.  If there are any questions pertaining to this issue, please contact me for further clarification.      Thank you for allowing me to participate in the care of your patient.      Sincerely,     Arya Allan MD, MD     [facility]  cc:   No referring provider defined for this encounter.

## 2024-05-01 NOTE — PROGRESS NOTES
CARDIOLOGY CLINIC CONSULTATION    PRIMARY CARE PHYSICIAN:  Karuna Bell MD    HISTORY OF PRESENT ILLNESS:  I had the pleasure of seeing Krupa Mack at the [facility] in [place] this morning. She is a very pleasant 59-year-old female with known coronary artery disease status post prior lateral  myocardial infarction in 2002.  Coronary angiogram at that time demonstrated an occluded large diagonal branch.  This was successfully stented with 2 stents.  Echocardiogram post MI demonstrated EF of 55%.      Since her MI and intervention, she has not had any cardiopulmonary symptoms.  She is active and denies chest pain or shortness of breath.  No palpitations, presyncope or syncope.     Her blood pressure has been elevated last few readings.  Her cholesterol is much better but still not optimal.    PAST MEDICAL HISTORY:  Past Medical History:   Diagnosis Date    CAD (coronary artery disease) 09/01/2012    Dyslipidemia     HTN (hypertension)     Hyperlipidaemia     NSTEMI (non-ST elevated myocardial infarction) (H) 09/2012    s/p MECCA to diagonal    Obesity     Tobacco abuse     quit 9/25/2012       MEDICATIONS:  Current Outpatient Medications   Medication Sig Dispense Refill    aspirin 81 MG EC tablet Take 1 tablet (81 mg) by mouth daily (Patient taking differently: Take 81 mg by mouth daily OTC) 10 tablet 0    ezetimibe (ZETIA) 10 MG tablet Take 1 tablet (10 mg) by mouth daily 90 tablet 3    lisinopril (ZESTRIL) 20 MG tablet Take 1 tablet (20 mg) by mouth daily 90 tablet 3    nitroGLYcerin (NITROSTAT) 0.4 MG sublingual tablet Place 1 tablet (0.4 mg) under the tongue every 5 minutes as needed for chest pain 25 tablet 1    rosuvastatin (CRESTOR) 40 MG tablet Take 1 tablet (40 mg) by mouth daily 90 tablet 3     No current facility-administered medications for this visit.       SOCIAL HISTORY:  I have reviewed this patient's social history and updated it with pertinent information if needed. Krupa YAN  [place]  reports that she has been smoking cigarettes. She has never used smokeless tobacco. She reports current alcohol use. She reports that she does not use drugs.    PHYSICAL EXAM:  Pulse:  [71] 71  BP: (169)/(90) 169/90  SpO2:  [96 %] 96 %  198 lbs 9.6 oz    Constitutional: alert, no distress  Respiratory: Good bilateral air entry  Cardiovascular: Regular rate and rhythm, no murmur  GI: nondistended  Neuropsychiatric: appropriate affact    ASSESSMENT: Pertinent issues addressed/ reviewed during this cardiology visit  Coronary artery disease status post prior lateral MI and stenting of the diagonal, very stable  Hypertension, not well-controlled  Hyperlipidemia    RECOMMENDATIONS:  She is doing quite well from cardiac point of view.  No cardiopulmonary symptoms.  Will have her stop the metoprolol and start lisinopril 20 mg daily for better blood pressure control.  Advised her to keep an eye on her blood pressure at home and let us know if the blood pressure is still not optimal (systolic less than 140)  LDL is 93 which is significantly improved from prior measurements.  She is on maximum rosuvastatin and Zetia.  She has gained weight over the past couple years.  Recommend significant weight loss to further decrease LDL.  She might need PCSK9 inhibitor if we can get her LDL below 70 with lifestyle modifications.    It was a pleasure seeing this patient in clinic today. Please do not hesitate to contact me with any future questions.     Arya Allan MD, [facility]  Cardiology - [facility]  May 1, 2024    Review of the result(s) of each unique test - Last BMP, lipid profile, ECG     The level of medical decision making during this visit was of moderate complexity.    This note was completed in part using dictation via the Dragon voice recognition software. Some word and grammatical errors may occur and must be interpreted in the appropriate clinical context.  If there are any questions pertaining to this issue, please  contact me for further clarification.

## 2025-04-22 ENCOUNTER — TELEPHONE (OUTPATIENT)
Dept: CARDIOLOGY | Facility: CLINIC | Age: 60
End: 2025-04-22
Payer: COMMERCIAL

## 2025-04-22 NOTE — TELEPHONE ENCOUNTER
1st attempt- Left voicemail for the patient to call back and schedule the following:    Appointment type:  Return Cardiology  Provider:  Jerrell  Return date:  4/28  Additional appointment(s) needed:  n/a  Additional Notes:  schedule opened for 4/28 and theres an appt on 7/22 diomedes (not 100% sure of exact day)  Specialty phone number: [phone]

## 2025-05-14 DIAGNOSIS — E78.5 HYPERLIPIDEMIA LDL GOAL <70: ICD-10-CM

## 2025-05-14 DIAGNOSIS — I25.10 CORONARY ARTERY DISEASE INVOLVING NATIVE CORONARY ARTERY OF NATIVE HEART WITHOUT ANGINA PECTORIS: ICD-10-CM

## 2025-05-14 DIAGNOSIS — E78.2 MIXED HYPERLIPIDEMIA: ICD-10-CM

## 2025-05-14 RX ORDER — LISINOPRIL 20 MG/1
20 TABLET ORAL DAILY
Qty: 90 TABLET | Refills: 1 | Status: SHIPPED | OUTPATIENT
Start: 2025-05-14

## 2025-05-14 RX ORDER — ROSUVASTATIN CALCIUM 40 MG/1
40 TABLET, COATED ORAL DAILY
Qty: 90 TABLET | Refills: 1 | Status: SHIPPED | OUTPATIENT
Start: 2025-05-14

## 2025-05-14 RX ORDER — EZETIMIBE 10 MG/1
10 TABLET ORAL DAILY
Qty: 90 TABLET | Refills: 1 | Status: SHIPPED | OUTPATIENT
Start: 2025-05-14